# Patient Record
Sex: FEMALE | Race: WHITE | Employment: FULL TIME | URBAN - METROPOLITAN AREA
[De-identification: names, ages, dates, MRNs, and addresses within clinical notes are randomized per-mention and may not be internally consistent; named-entity substitution may affect disease eponyms.]

---

## 2019-10-29 ENCOUNTER — OFFICE VISIT (OUTPATIENT)
Dept: FAMILY MEDICINE | Facility: CLINIC | Age: 54
End: 2019-10-29
Payer: COMMERCIAL

## 2019-10-29 VITALS
DIASTOLIC BLOOD PRESSURE: 90 MMHG | WEIGHT: 206.6 LBS | OXYGEN SATURATION: 96 % | TEMPERATURE: 99 F | HEIGHT: 67 IN | RESPIRATION RATE: 15 BRPM | HEART RATE: 68 BPM | BODY MASS INDEX: 32.43 KG/M2 | SYSTOLIC BLOOD PRESSURE: 150 MMHG

## 2019-10-29 DIAGNOSIS — H65.02 NON-RECURRENT ACUTE SEROUS OTITIS MEDIA OF LEFT EAR: Primary | ICD-10-CM

## 2019-10-29 PROCEDURE — 99203 OFFICE O/P NEW LOW 30 MIN: CPT | Performed by: NURSE PRACTITIONER

## 2019-10-29 RX ORDER — FLUTICASONE PROPIONATE 50 MCG
2 SPRAY, SUSPENSION (ML) NASAL DAILY
Qty: 16 G | Refills: 1 | Status: SHIPPED | OUTPATIENT
Start: 2019-10-29 | End: 2024-04-01

## 2019-10-29 RX ORDER — IBUPROFEN 600 MG/1
600 TABLET, FILM COATED ORAL PRN
Refills: 0 | COMMUNITY
Start: 2019-10-16 | End: 2024-04-23

## 2019-10-29 ASSESSMENT — MIFFLIN-ST. JEOR: SCORE: 1574.76

## 2019-10-29 NOTE — PROGRESS NOTES
Yanira Jaime is a 54 year old female who presents to clinic today for the following health issues:  Ear Problem       Duration: 3 weeks     Description (location/character/radiation): left ear    Intensity:  mild, 3/10    Accompanying signs and symptoms: feels plugged and painful at times; always plugged and when unable to hear will pinch nose and push air to open up the ear and then it is painful    History (similar episodes/previous evaluation): None    Precipitating or alleviating factors: None    Therapies tried and outcome: tylenol    Was taking amoxicillin for 2 weeks that was prescribed by the dentist for an infection in a tooth started on 10/16/19, and felt that there was improvement in the ear also.  BP elevated today. Did take a 5 hour caffeine drink.    No congestion or sinus pressure.  Started with the tooth pain.    Patient Active Problem List   Diagnosis     Tobacco use disorder     History reviewed. No pertinent surgical history.    Social History     Tobacco Use     Smoking status: Current Every Day Smoker     Packs/day: 0.50     Years: 37.00     Pack years: 18.50     Smokeless tobacco: Never Used   Substance Use Topics     Alcohol use: Yes     Comment: 3-4 drinks twice a week; beer     Family History   Problem Relation Age of Onset     CABG Father      Hypertension Father      Hyperlipidemia Father          Current Outpatient Medications   Medication Sig Dispense Refill     ibuprofen (ADVIL/MOTRIN) 600 MG tablet Take 600 mg by mouth as needed  0     No Known Allergies    Reviewed and updated as needed this visit by Provider  Allergies  Meds  Problems  Med Hx  Surg Hx  Fam Hx         Review of Systems   ROS COMP: CONSTITUTIONAL: NEGATIVE for fever, chills, change in weight  ENT/MOUTH: POSITIVE for ear pain left  RESP: NEGATIVE for significant cough or SOB  CV: NEGATIVE for chest pain, palpitations or peripheral edema  PSYCHIATRIC: NEGATIVE for changes in mood or affect  ROS otherwise  "negative      Objective    BP (!) 150/90   Pulse 68   Temp 99  F (37.2  C) (Tympanic)   Resp 15   Ht 1.702 m (5' 7\")   Wt 93.7 kg (206 lb 9.6 oz)   SpO2 96%   BMI 32.36 kg/m    Body mass index is 32.36 kg/m .  Physical Exam   GENERAL: healthy, alert and no distress  HENT: normal cephalic/atraumatic, right ear: normal: no effusions, no erythema, normal landmarks, left ear: serous effusion with mid retraction of the TM, nose and mouth without ulcers or lesions, oropharynx clear, oral mucous membranes moist and sinuses: not tender  RESP: lungs clear to auscultation - no rales, rhonchi or wheezes  CV: regular rate and rhythm, normal S1 S2, no S3 or S4, no murmur, click or rub, no peripheral edema and peripheral pulses strong  PSYCH: mentation appears normal, affect normal/bright    Diagnostic Test Results:  Labs reviewed in Epic        Assessment & Plan     1. Non-recurrent acute serous otitis media of left ear  Treating with Flonase to see if this improves symptoms.  If persistent, recommend follow-up with ENT for further evaluation.  - fluticasone (FLONASE) 50 MCG/ACT nasal spray; Spray 2 sprays into both nostrils daily  Dispense: 16 g; Refill: 1  - OTOLARYNGOLOGY REFERRAL     See Patient Instructions    Return in about 1 week (around 11/5/2019), or if symptoms worsen or fail to improve, for follow-up with ENT.    Char Smallwood NP  [facility]      "

## 2019-10-29 NOTE — PATIENT INSTRUCTIONS
1.  Make appointment with ENT and you can cancel if symptoms improve.  2.  Take nasal spray as directed over the next week.  3.  Follow-up with ENT if symptoms are not improving.

## 2019-11-13 NOTE — PROGRESS NOTES
Chief Complaint   Patient presents with     Ear Problem     history of left ear infection and put on oral antibiotics 10/16/19- c/o pain at intervals and crackling noise in ear- feels plugged     History of Present Illness   Yanira Jaime is a 54 year old female who presents to me today for ear evaluation.  I am seeing this patient in consultation for non-recurrent acute serous otitis media of the left ear at the request of the provider Char Smallwood CNP.  The patient reports pressure or a plugged feeling in left ear.  It has been present and noticeable for approximately 1 month.  This was preceded by an upper respiratory infection and a left ear infection treated with antibiotics. There is no history of chronic ear disease or ear surgery. The patient denies vertigo, otorrhea, but has some mild otalgia.  The patient previous recreational and current work-related noise exposure.  The patient has tried Flonase, but these things have not helped.     Past Medical History  Patient Active Problem List   Diagnosis     Tobacco use disorder     Current Medications     Current Outpatient Medications:      fluticasone (FLONASE) 50 MCG/ACT nasal spray, Spray 2 sprays into both nostrils daily (Patient not taking: Reported on 11/15/2019), Disp: 16 g, Rfl: 1     ibuprofen (ADVIL/MOTRIN) 600 MG tablet, Take 600 mg by mouth as needed, Disp: , Rfl: 0    Allergies  No Known Allergies    Social History   Social History     Socioeconomic History     Marital status: Legally      Spouse name: Not on file     Number of children: Not on file     Years of education: Not on file     Highest education level: Not on file   Occupational History     Not on file   Social Needs     Financial resource strain: Not on file     Food insecurity:     Worry: Not on file     Inability: Not on file     Transportation needs:     Medical: Not on file     Non-medical: Not on file   Tobacco Use     Smoking status: Current Every Day Smoker      Packs/day: 0.50     Years: 37.00     Pack years: 18.50     Smokeless tobacco: Never Used   Substance and Sexual Activity     Alcohol use: Yes     Comment: 3-4 drinks twice a week; beer     Drug use: Not Currently     Sexual activity: Not Currently     Partners: Male   Lifestyle     Physical activity:     Days per week: Not on file     Minutes per session: Not on file     Stress: Not on file   Relationships     Social connections:     Talks on phone: Not on file     Gets together: Not on file     Attends Islam service: Not on file     Active member of club or organization: Not on file     Attends meetings of clubs or organizations: Not on file     Relationship status: Not on file     Intimate partner violence:     Fear of current or ex partner: Not on file     Emotionally abused: Not on file     Physically abused: Not on file     Forced sexual activity: Not on file   Other Topics Concern     Not on file   Social History Narrative     Not on file       Family History  Family History   Problem Relation Age of Onset     CABG Father      Hypertension Father      Hyperlipidemia Father        Review of Systems  As per HPI and PMHx, otherwise 10+ comprehensive system review is negative.    Physical Exam  BP (!) 145/85   Pulse 73   Temp 98.2  F (36.8  C) (Oral)   Wt 93.4 kg (206 lb)   BMI 32.26 kg/m    GENERAL: Patient is a pleasant, cooperative 54 year old female in no acute distress.  HEAD: Normocephalic, atraumatic.  Hair and scalp are normal.  EYES: Pupils are equal, round, reactive to light and accommodation.  Extraocular movements are intact.  The sclera nonicteric without injection.  The extraocular structures are normal.  EARS: See below.  NOSE: Nares are patent.  Nasal mucosa is pink and moist.  Negative anterior rhinoscopy.  NEUROLOGIC: Cranial nerves II through XII are grossly intact.  Voice is strong.  Patient is House-Brackman I/VI bilaterally.  CARDIOVASCULAR: Extremities are warm and well-perfused.  No  significant peripheral edema.  RESPIRATORY: Patient has nonlabored breathing without cough, wheeze, stridor.  PSYCHIATRIC: Patient is alert and oriented.  Mood and affect appear normal.  SKIN: Warm and dry.  No scalp, face, or neck lesions noted.    Physical Exam and Procedure    EARS: Normal shape and symmetry.  No tenderness when palpating the mastoid or tragal areas bilaterally.  The ears were then examined under the otic binocular microscope.  Otoscopic exam on the right reveals a moderate amount of cerumen that I removed with alligator forceps.  The right tympanic membrane was round, intact without evidence of effusion.  No retraction, granulation, drainage, or evidence of cholesteatoma.      Attention was turned to the left ear.  Otoscopic exam on the left reveals a moderate amount of cerumen that I removed with an alligator forceps. The left tympanic membrane was intact with moderate retraction and evidence of serous middle ear effusion.  No granulation, drainage, or evidence of cholesteatoma.      Assessment and Plan     ICD-10-CM    1. Left chronic serous otitis media H65.22 AUDIOLOGY ADULT REFERRAL     Microscopy, Binocular     Tympanotomy W/Tube   2. Conductive hearing loss of left ear with unrestricted hearing of right ear H90.12 AUDIOLOGY ADULT REFERRAL     Microscopy, Binocular     Tympanotomy W/Tube   3. Dysfunction of left eustachian tube H69.82 AUDIOLOGY ADULT REFERRAL     Microscopy, Binocular     Tympanotomy W/Tube   4. Retracted tympanic membrane, left H73.892 AUDIOLOGY ADULT REFERRAL     Microscopy, Binocular     Tympanotomy W/Tube     It was my pleasure seeing Yanira Jaime today in clinic.  The patient presented today today with middle ear effusion and retraction on the left-hand side for over a month.  She tried Flonase without benefit.  She is having problems with her ear still being plugged.  We discussed close observation versus ear tube placement.  After discussion, the patient would  like to move forward with an ear tube on the left.     We discussed the risks, benefits, alternatives, options of left myringotomy with tube placement including, but not limited to: Risk of bleeding, risk of infection, risk of retained tympanostomy tube, risk of tympanic membrane perforation, risk of recurrent otorrhea, tympanostomy tube failure, potential need for additional procedures including tube removal/replacement.  We discussed the postoperative course and convalescence including using eardrops.  The patient voiced understanding and is  willing to proceed.  We see procedure note for the details.  The patient will return in 2 to 3 months for recheck with a hearing test.  We discussed dry ear precautions and what to do in the event of acute otorrhea.    Yanira to follow up with Primary Care provider regarding elevated blood pressure.    Pradip Friedman MD  Department of Otolarygology-Head and Neck Surgery  [facility]

## 2019-11-15 ENCOUNTER — OFFICE VISIT (OUTPATIENT)
Dept: OTOLARYNGOLOGY | Facility: CLINIC | Age: 54
End: 2019-11-15
Payer: COMMERCIAL

## 2019-11-15 VITALS
WEIGHT: 206 LBS | DIASTOLIC BLOOD PRESSURE: 85 MMHG | BODY MASS INDEX: 32.26 KG/M2 | SYSTOLIC BLOOD PRESSURE: 145 MMHG | TEMPERATURE: 98.2 F | HEART RATE: 73 BPM

## 2019-11-15 DIAGNOSIS — H69.92 DYSFUNCTION OF LEFT EUSTACHIAN TUBE: ICD-10-CM

## 2019-11-15 DIAGNOSIS — H90.12 CONDUCTIVE HEARING LOSS OF LEFT EAR WITH UNRESTRICTED HEARING OF RIGHT EAR: ICD-10-CM

## 2019-11-15 DIAGNOSIS — H65.22 LEFT CHRONIC SEROUS OTITIS MEDIA: Primary | ICD-10-CM

## 2019-11-15 DIAGNOSIS — H73.892 RETRACTED TYMPANIC MEMBRANE, LEFT: ICD-10-CM

## 2019-11-15 PROCEDURE — 99203 OFFICE O/P NEW LOW 30 MIN: CPT | Mod: 25 | Performed by: OTOLARYNGOLOGY

## 2019-11-15 PROCEDURE — 69433 CREATE EARDRUM OPENING: CPT | Mod: LT | Performed by: OTOLARYNGOLOGY

## 2019-11-15 NOTE — PROCEDURES
PREOPERATIVE DIAGNOSES: Chronic left otitis media with effusion, history of acute otitis media, left conductive hearing loss.    POSTOPERATIVE DIAGNOSES: Same.     PROCEDURE PERFORMED:   1. Left myringotomy with tympanostomy tube placement     SURGEON: Pradip Friedman MD      ASSISTANTS: None.     BLOOD LOSS: Scant.     COMPLICATIONS: None.      SPECIMENS: None.     ANESTHESIA: Local.     GRAFTS, IMPLANTS, DRAINS: None.     INDICATIONS: Prevent complications, treat disease.    FINDINGS:   1. Left intact tympanic membrane with moderate retraction and serous middle-ear effusion.    OPERATIVE TECHNIQUE: The patient was brought to the procedure room and identified by name clinic number.  They were placed supinely on the procedure chair.  The patient was prepped and draped in standard fashion.       After standard surgical pause, the microscope was brought to the field and used throughout the remainder of the case.  I first examined the ear on the left.  The patient had moderate retraction and evidence of serous middle ear effusion.  Phenol was placed in the posterior-inferior quadrant.  A radial myringotomy was then placed in the posterior-inferior quadrant.  The middle ear was suctioned free and Dura-Vent ear tube was placed into the myringotomy.     This marked the end of the procedure.  The patient tolerated the procedure well.  There were no complications.  There was minimal blood loss.  All standard protocol and universal precautions were used throughout the procedure.     Pradip Friedman MD  Department of Otolarygology-Head and Neck Surgery  [facility]

## 2019-11-15 NOTE — NURSING NOTE
"Initial BP (!) 144/82 (BP Location: Right arm, Patient Position: Chair, Cuff Size: Adult Large)   Pulse 73   Temp 98.2  F (36.8  C) (Oral)   Wt 93.4 kg (206 lb)   BMI 32.26 kg/m   Estimated body mass index is 32.26 kg/m  as calculated from the following:    Height as of 10/29/19: 1.702 m (5' 7\").    Weight as of this encounter: 93.4 kg (206 lb). .    Radhika Perrin LPN    "

## 2019-11-15 NOTE — LETTER
11/15/2019         RE: Yanira Jaime  [address]  [address]  [place], [place] [address]        Dear Colleague,    Thank you for referring your patient, Yanira Jaime, to the [facility]. Please see a copy of my visit note below.    Chief Complaint   Patient presents with     Ear Problem     history of left ear infection and put on oral antibiotics 10/16/19- c/o pain at intervals and crackling noise in ear- feels plugged     History of Present Illness   Yanira Jaime is a 54 year old female who presents to me today for ear evaluation.  I am seeing this patient in consultation for non-recurrent acute serous otitis media of the left ear at the request of the provider Char Smallwood CNP.  The patient reports pressure or a plugged feeling in left ear.  It has been present and noticeable for approximately 1 month.  This was preceded by an upper respiratory infection and a left ear infection treated with antibiotics. There is no history of chronic ear disease or ear surgery. The patient denies vertigo, otorrhea, but has some mild otalgia.  The patient previous recreational and current work-related noise exposure.  The patient has tried Flonase, but these things have not helped.     Past Medical History  Patient Active Problem List   Diagnosis     Tobacco use disorder     Current Medications     Current Outpatient Medications:      fluticasone (FLONASE) 50 MCG/ACT nasal spray, Spray 2 sprays into both nostrils daily (Patient not taking: Reported on 11/15/2019), Disp: 16 g, Rfl: 1     ibuprofen (ADVIL/MOTRIN) 600 MG tablet, Take 600 mg by mouth as needed, Disp: , Rfl: 0    Allergies  No Known Allergies    Social History   Social History     Socioeconomic History     Marital status: Legally      Spouse name: Not on file     Number of children: Not on file     Years of education: Not on file     Highest education level: Not on file   Occupational History     Not on file   Social Needs      Financial resource strain: Not on file     Food insecurity:     Worry: Not on file     Inability: Not on file     Transportation needs:     Medical: Not on file     Non-medical: Not on file   Tobacco Use     Smoking status: Current Every Day Smoker     Packs/day: 0.50     Years: 37.00     Pack years: 18.50     Smokeless tobacco: Never Used   Substance and Sexual Activity     Alcohol use: Yes     Comment: 3-4 drinks twice a week; beer     Drug use: Not Currently     Sexual activity: Not Currently     Partners: Male   Lifestyle     Physical activity:     Days per week: Not on file     Minutes per session: Not on file     Stress: Not on file   Relationships     Social connections:     Talks on phone: Not on file     Gets together: Not on file     Attends Buddhism service: Not on file     Active member of club or organization: Not on file     Attends meetings of clubs or organizations: Not on file     Relationship status: Not on file     Intimate partner violence:     Fear of current or ex partner: Not on file     Emotionally abused: Not on file     Physically abused: Not on file     Forced sexual activity: Not on file   Other Topics Concern     Not on file   Social History Narrative     Not on file       Family History  Family History   Problem Relation Age of Onset     CABG Father      Hypertension Father      Hyperlipidemia Father        Review of Systems  As per HPI and PMHx, otherwise 10+ comprehensive system review is negative.    Physical Exam  BP (!) 145/85   Pulse 73   Temp 98.2  F (36.8  C) (Oral)   Wt 93.4 kg (206 lb)   BMI 32.26 kg/m     GENERAL: Patient is a pleasant, cooperative 54 year old female in no acute distress.  HEAD: Normocephalic, atraumatic.  Hair and scalp are normal.  EYES: Pupils are equal, round, reactive to light and accommodation.  Extraocular movements are intact.  The sclera nonicteric without injection.  The extraocular structures are normal.  EARS: See below.  NOSE: Nares are  patent.  Nasal mucosa is pink and moist.  Negative anterior rhinoscopy.  NEUROLOGIC: Cranial nerves II through XII are grossly intact.  Voice is strong.  Patient is House-Brackman I/VI bilaterally.  CARDIOVASCULAR: Extremities are warm and well-perfused.  No significant peripheral edema.  RESPIRATORY: Patient has nonlabored breathing without cough, wheeze, stridor.  PSYCHIATRIC: Patient is alert and oriented.  Mood and affect appear normal.  SKIN: Warm and dry.  No scalp, face, or neck lesions noted.    Physical Exam and Procedure    EARS: Normal shape and symmetry.  No tenderness when palpating the mastoid or tragal areas bilaterally.  The ears were then examined under the otic binocular microscope.  Otoscopic exam on the right reveals a moderate amount of cerumen that I removed with alligator forceps.  The right tympanic membrane was round, intact without evidence of effusion.  No retraction, granulation, drainage, or evidence of cholesteatoma.      Attention was turned to the left ear.  Otoscopic exam on the left reveals a moderate amount of cerumen that I removed with an alligator forceps. The left tympanic membrane was intact with moderate retraction and evidence of serous middle ear effusion.  No granulation, drainage, or evidence of cholesteatoma.      Assessment and Plan     ICD-10-CM    1. Left chronic serous otitis media H65.22 AUDIOLOGY ADULT REFERRAL     Microscopy, Binocular     Tympanotomy W/Tube   2. Conductive hearing loss of left ear with unrestricted hearing of right ear H90.12 AUDIOLOGY ADULT REFERRAL     Microscopy, Binocular     Tympanotomy W/Tube   3. Dysfunction of left eustachian tube H69.82 AUDIOLOGY ADULT REFERRAL     Microscopy, Binocular     Tympanotomy W/Tube   4. Retracted tympanic membrane, left H73.892 AUDIOLOGY ADULT REFERRAL     Microscopy, Binocular     Tympanotomy W/Tube     It was my pleasure seeing Yanira Jaime today in clinic.  The patient presented today today with middle  ear effusion and retraction on the left-hand side for over a month.  She tried Flonase without benefit.  She is having problems with her ear still being plugged.  We discussed close observation versus ear tube placement.  After discussion, the patient would like to move forward with an ear tube on the left.     We discussed the risks, benefits, alternatives, options of left myringotomy with tube placement including, but not limited to: Risk of bleeding, risk of infection, risk of retained tympanostomy tube, risk of tympanic membrane perforation, risk of recurrent otorrhea, tympanostomy tube failure, potential need for additional procedures including tube removal/replacement.  We discussed the postoperative course and convalescence including using eardrops.  The patient voiced understanding and is  willing to proceed.  We see procedure note for the details.  The patient will return in 2 to 3 months for recheck with a hearing test.  We discussed dry ear precautions and what to do in the event of acute otorrhea.    Pradip Friedman MD  Department of Otolarygology-Head and Neck Surgery  [facility]      Again, thank you for allowing me to participate in the care of your patient.        Sincerely,        Pradip Friedman MD

## 2022-05-05 ENCOUNTER — TRANSFERRED RECORDS (OUTPATIENT)
Dept: MULTI SPECIALTY CLINIC | Facility: CLINIC | Age: 57
End: 2022-05-05

## 2022-05-05 LAB — PAP SMEAR - HIM PATIENT REPORTED: NORMAL

## 2022-08-19 ENCOUNTER — OFFICE VISIT (OUTPATIENT)
Dept: URGENT CARE | Facility: URGENT CARE | Age: 57
End: 2022-08-19
Payer: COMMERCIAL

## 2022-08-19 VITALS
TEMPERATURE: 98 F | SYSTOLIC BLOOD PRESSURE: 162 MMHG | HEART RATE: 82 BPM | DIASTOLIC BLOOD PRESSURE: 80 MMHG | BODY MASS INDEX: 33.36 KG/M2 | WEIGHT: 213 LBS

## 2022-08-19 DIAGNOSIS — I10 BENIGN ESSENTIAL HYPERTENSION: ICD-10-CM

## 2022-08-19 DIAGNOSIS — H69.91 DYSFUNCTION OF RIGHT EUSTACHIAN TUBE: Primary | ICD-10-CM

## 2022-08-19 PROCEDURE — 99213 OFFICE O/P EST LOW 20 MIN: CPT | Performed by: PHYSICIAN ASSISTANT

## 2022-08-19 RX ORDER — ALBUTEROL SULFATE 90 UG/1
AEROSOL, METERED RESPIRATORY (INHALATION)
COMMUNITY
Start: 2022-08-08 | End: 2024-04-23

## 2022-08-19 RX ORDER — FLUTICASONE PROPIONATE 50 MCG
1 SPRAY, SUSPENSION (ML) NASAL 2 TIMES DAILY
Qty: 16 G | Refills: 1 | Status: SHIPPED | OUTPATIENT
Start: 2022-08-19 | End: 2024-04-01

## 2022-08-19 RX ORDER — LISINOPRIL 10 MG/1
TABLET ORAL
COMMUNITY
Start: 2021-10-13 | End: 2024-04-01

## 2022-08-19 RX ORDER — ALBUTEROL SULFATE 90 UG/1
2 AEROSOL, METERED RESPIRATORY (INHALATION)
COMMUNITY
Start: 2022-02-17 | End: 2024-04-01

## 2022-08-19 NOTE — PROGRESS NOTES
Assessment & Plan     Dysfunction of right eustachian tube    - fluticasone (FLONASE) 50 MCG/ACT nasal spray; Spray 1 spray into both nostrils 2 times daily    Benign essential hypertension  Monitor blood pressure at home and if average pressure is greater than or equal to 140/90 follow up with primary care provider                         Return in about 1 week (around 8/26/2022), or if symptoms worsen or fail to improve.    EBER Staley [facility]                Subjective   Chief Complaint   Patient presents with     Ear Problem     Ear pain on the right has been coming on slowly, sharp pains now, popping and cracking.  Had ear infection mid-July, treated with amoxicillin, feels like it wasn't all gone.         HPI     Ear problem   Onset of symptoms was 4 day(s) ago.  Course of illness is worsening.    Severity moderate  Current and Associated symptoms: right ear pain  Treatment measures tried include was treated with amoxicillin for ear infection several weeks ago.  Predisposing factors include None.                Review of Systems   Constitutional, HEENT, cardiovascular, pulmonary, gi and gu systems are negative, except as otherwise noted.      Objective    BP (!) 162/80   Pulse 82   Temp 98  F (36.7  C) (Tympanic)   Wt 96.6 kg (213 lb)   BMI 33.36 kg/m    Body mass index is 33.36 kg/m .  Physical Exam  Constitutional:       Appearance: She is well-developed.   HENT:      Head: Normocephalic.      Right Ear: Tympanic membrane and ear canal normal.      Left Ear: Tympanic membrane and ear canal normal.   Eyes:      Conjunctiva/sclera: Conjunctivae normal.   Cardiovascular:      Rate and Rhythm: Normal rate.      Heart sounds: Normal heart sounds.   Pulmonary:      Effort: Pulmonary effort is normal.      Breath sounds: Normal breath sounds.   Skin:     General: Skin is warm and dry.      Findings: No rash.   Psychiatric:         Behavior: Behavior normal.                             .  ..

## 2024-04-01 ENCOUNTER — OFFICE VISIT (OUTPATIENT)
Dept: URGENT CARE | Facility: URGENT CARE | Age: 59
End: 2024-04-01
Payer: COMMERCIAL

## 2024-04-01 VITALS
SYSTOLIC BLOOD PRESSURE: 148 MMHG | HEART RATE: 60 BPM | WEIGHT: 250 LBS | BODY MASS INDEX: 39.16 KG/M2 | DIASTOLIC BLOOD PRESSURE: 86 MMHG | RESPIRATION RATE: 16 BRPM | TEMPERATURE: 97.3 F | OXYGEN SATURATION: 97 %

## 2024-04-01 DIAGNOSIS — M10.9 ACUTE GOUT INVOLVING TOE OF LEFT FOOT, UNSPECIFIED CAUSE: Primary | ICD-10-CM

## 2024-04-01 PROCEDURE — 99213 OFFICE O/P EST LOW 20 MIN: CPT | Performed by: NURSE PRACTITIONER

## 2024-04-01 RX ORDER — FLUTICASONE PROPIONATE AND SALMETEROL XINAFOATE 45; 21 UG/1; UG/1
2 AEROSOL, METERED RESPIRATORY (INHALATION) EVERY 12 HOURS
COMMUNITY
Start: 2023-02-28 | End: 2024-04-23

## 2024-04-01 RX ORDER — PREDNISONE 20 MG/1
40 TABLET ORAL DAILY
Qty: 10 TABLET | Refills: 0 | Status: SHIPPED | OUTPATIENT
Start: 2024-04-01 | End: 2024-04-06

## 2024-04-01 NOTE — PATIENT INSTRUCTIONS
See handout on gout.  Take prednisone daily for 5 days to help with pain and inflammation.   Follow up with your primary provider for recheck in the next 3-5 days if not improving as expected.

## 2024-04-01 NOTE — PROGRESS NOTES
SUBJECTIVE:   Yanira Jaime is a 58 year old female presenting with a chief complaint of   Chief Complaint   Patient presents with    Arthritis     About 7 days ago pt noticed her left great toe hurt, her foot gradually got more swollen and painful, has difficulty at her job where she does a lot of walking.  Pain and swelling is just in the left foot.  Needs a work note.       No past medical history on file.  Family History   Problem Relation Age of Onset    CABG Father     Hypertension Father     Hyperlipidemia Father      Current Outpatient Medications   Medication Sig Dispense Refill    fluticasone-salmeterol (ADVAIR-HFA) 45-21 MCG/ACT inhaler Inhale 2 puffs into the lungs every 12 hours      albuterol (PROAIR HFA/PROVENTIL HFA/VENTOLIN HFA) 108 (90 Base) MCG/ACT inhaler  (Patient not taking: Reported on 4/1/2024)      albuterol (PROAIR HFA/PROVENTIL HFA/VENTOLIN HFA) 108 (90 Base) MCG/ACT inhaler Inhale 2 puffs into the lungs (Patient not taking: Reported on 4/1/2024)      fluticasone (FLONASE) 50 MCG/ACT nasal spray Spray 1 spray into both nostrils 2 times daily (Patient not taking: Reported on 4/1/2024) 16 g 1    fluticasone (FLONASE) 50 MCG/ACT nasal spray Spray 2 sprays into both nostrils daily (Patient not taking: Reported on 11/15/2019) 16 g 1    ibuprofen (ADVIL/MOTRIN) 600 MG tablet Take 600 mg by mouth as needed (Patient not taking: Reported on 4/1/2024)  0    lisinopril (ZESTRIL) 10 MG tablet  (Patient not taking: Reported on 4/1/2024)       Social History     Tobacco Use    Smoking status: Every Day     Packs/day: 0.50     Years: 37.00     Additional pack years: 0.00     Total pack years: 18.50     Types: Cigarettes    Smokeless tobacco: Never   Substance Use Topics    Alcohol use: Yes     Comment: 3-4 drinks twice a week; beer       OBJECTIVE  BP (!) 148/86   Pulse 60   Temp 97.3  F (36.3  C) (Tympanic)   Resp 16   Wt 113.4 kg (250 lb)   SpO2 97%   BMI 39.16 kg/m      Physical  Exam  Musculoskeletal:        Feet:    Feet:      Comments: Redness, swelling and pain with palpation.    Pt reports history of gout but usually in the other foot. Does not have a uric acid on file, will check uric acid today and start prednisone.    Uric Acid: pending    ASSESSMENT:  1. Acute gout involving toe of left foot, unspecified cause    - predniSONE (DELTASONE) 20 MG tablet; Take 2 tablets (40 mg) by mouth daily for 5 days  Dispense: 10 tablet; Refill: 0  - Uric acid; Future  - Uric acid    PLAN:  See handout on gout.  Take prednisone daily for 5 days to help with pain and inflammation.   Follow up with your primary provider for recheck in the next 3-5 days if not improving as expected.

## 2024-04-01 NOTE — LETTER
April 1, 2024      Yanira Jaime  [address] [facility] [address]        To Whom It May Concern:    Yaniar Jaime  was seen on 4/1/24.  She will need to miss work for the next few days. I expect her condition to improve over the next 2-3 days. Please excuse her absence.      Sincerely,        Areli Ellison NP

## 2024-04-02 ENCOUNTER — DOCUMENTATION ONLY (OUTPATIENT)
Dept: URGENT CARE | Facility: URGENT CARE | Age: 59
End: 2024-04-02
Payer: COMMERCIAL

## 2024-04-02 DIAGNOSIS — M10.9 ACUTE GOUT INVOLVING TOE OF LEFT FOOT, UNSPECIFIED CAUSE: Primary | ICD-10-CM

## 2024-04-02 NOTE — PROGRESS NOTES
"Patient was in on 4/1/2024 for urgent care.  An uric acid was ordered and then collected by lab staff.  We were notified this morning that the test had to be canceled as \"Quantity Not Sufficient\".  If you would like the patient come back for this test please sign pended order and have patient contacted to make a lab-only appointment.  Thanks!  "

## 2024-04-03 NOTE — PROGRESS NOTES
Patient informed about blood draw and told new order was in and to do a lab only visit.    Claudia Macdonald LPN

## 2024-04-23 ENCOUNTER — OFFICE VISIT (OUTPATIENT)
Dept: FAMILY MEDICINE | Facility: CLINIC | Age: 59
End: 2024-04-23
Payer: COMMERCIAL

## 2024-04-23 VITALS
OXYGEN SATURATION: 96 % | HEART RATE: 72 BPM | WEIGHT: 229 LBS | RESPIRATION RATE: 18 BRPM | SYSTOLIC BLOOD PRESSURE: 162 MMHG | TEMPERATURE: 97.5 F | HEIGHT: 67 IN | BODY MASS INDEX: 35.94 KG/M2 | DIASTOLIC BLOOD PRESSURE: 90 MMHG

## 2024-04-23 DIAGNOSIS — J45.40 MODERATE PERSISTENT ASTHMA WITHOUT COMPLICATION: ICD-10-CM

## 2024-04-23 DIAGNOSIS — G43.109 MIGRAINE WITH AURA AND WITHOUT STATUS MIGRAINOSUS, NOT INTRACTABLE: Primary | ICD-10-CM

## 2024-04-23 DIAGNOSIS — Z82.49 FAMILY HISTORY OF ISCHEMIC HEART DISEASE: ICD-10-CM

## 2024-04-23 DIAGNOSIS — Z12.31 VISIT FOR SCREENING MAMMOGRAM: ICD-10-CM

## 2024-04-23 DIAGNOSIS — R03.0 ELEVATED BLOOD PRESSURE READING WITHOUT DIAGNOSIS OF HYPERTENSION: ICD-10-CM

## 2024-04-23 DIAGNOSIS — M25.552 BILATERAL HIP PAIN: ICD-10-CM

## 2024-04-23 DIAGNOSIS — G62.9 NEUROPATHY: ICD-10-CM

## 2024-04-23 DIAGNOSIS — R90.82 WHITE MATTER ABNORMALITY ON MRI OF BRAIN: ICD-10-CM

## 2024-04-23 DIAGNOSIS — M10.9 ACUTE GOUT INVOLVING TOE OF LEFT FOOT, UNSPECIFIED CAUSE: ICD-10-CM

## 2024-04-23 DIAGNOSIS — M25.551 BILATERAL HIP PAIN: ICD-10-CM

## 2024-04-23 DIAGNOSIS — Z82.0 FAMILY HISTORY OF MS (MULTIPLE SCLEROSIS): ICD-10-CM

## 2024-04-23 LAB
ALBUMIN SERPL BCG-MCNC: 4.1 G/DL (ref 3.5–5.2)
ALBUMIN UR-MCNC: NEGATIVE MG/DL
ALP SERPL-CCNC: 79 U/L (ref 40–150)
ALT SERPL W P-5'-P-CCNC: 25 U/L (ref 0–50)
ANION GAP SERPL CALCULATED.3IONS-SCNC: 14 MMOL/L (ref 7–15)
APPEARANCE UR: CLEAR
AST SERPL W P-5'-P-CCNC: 23 U/L (ref 0–45)
BACTERIA #/AREA URNS HPF: ABNORMAL /HPF
BILIRUB SERPL-MCNC: <0.2 MG/DL
BILIRUB UR QL STRIP: NEGATIVE
BUN SERPL-MCNC: 13.1 MG/DL (ref 6–20)
CALCIUM SERPL-MCNC: 9.1 MG/DL (ref 8.6–10)
CHLORIDE SERPL-SCNC: 103 MMOL/L (ref 98–107)
CHOLEST SERPL-MCNC: 197 MG/DL
COLOR UR AUTO: YELLOW
CREAT SERPL-MCNC: 0.77 MG/DL (ref 0.51–0.95)
CRP SERPL-MCNC: 18.42 MG/L
DEPRECATED HCO3 PLAS-SCNC: 22 MMOL/L (ref 22–29)
EGFRCR SERPLBLD CKD-EPI 2021: 89 ML/MIN/1.73M2
ERYTHROCYTE [DISTWIDTH] IN BLOOD BY AUTOMATED COUNT: 12.6 % (ref 10–15)
ERYTHROCYTE [SEDIMENTATION RATE] IN BLOOD BY WESTERGREN METHOD: 25 MM/HR (ref 0–30)
FASTING STATUS PATIENT QL REPORTED: NO
FERRITIN SERPL-MCNC: 232 NG/ML (ref 11–328)
GLUCOSE SERPL-MCNC: 131 MG/DL (ref 70–99)
GLUCOSE UR STRIP-MCNC: NEGATIVE MG/DL
HBA1C MFR BLD: 6.3 % (ref 0–5.6)
HCT VFR BLD AUTO: 42.6 % (ref 35–47)
HDLC SERPL-MCNC: 37 MG/DL
HGB BLD-MCNC: 14.2 G/DL (ref 11.7–15.7)
HGB UR QL STRIP: ABNORMAL
IRON BINDING CAPACITY (ROCHE): 261 UG/DL (ref 240–430)
IRON SATN MFR SERPL: 24 % (ref 15–46)
IRON SERPL-MCNC: 62 UG/DL (ref 37–145)
KETONES UR STRIP-MCNC: NEGATIVE MG/DL
LDLC SERPL CALC-MCNC: ABNORMAL MG/DL
LEUKOCYTE ESTERASE UR QL STRIP: NEGATIVE
MCH RBC QN AUTO: 29.9 PG (ref 26.5–33)
MCHC RBC AUTO-ENTMCNC: 33.3 G/DL (ref 31.5–36.5)
MCV RBC AUTO: 90 FL (ref 78–100)
NITRATE UR QL: NEGATIVE
NONHDLC SERPL-MCNC: 160 MG/DL
PH UR STRIP: 5.5 [PH] (ref 5–7)
PLATELET # BLD AUTO: 302 10E3/UL (ref 150–450)
POTASSIUM SERPL-SCNC: 4.3 MMOL/L (ref 3.4–5.3)
PROT SERPL-MCNC: 7.3 G/DL (ref 6.4–8.3)
RBC # BLD AUTO: 4.75 10E6/UL (ref 3.8–5.2)
RBC #/AREA URNS AUTO: ABNORMAL /HPF
SODIUM SERPL-SCNC: 139 MMOL/L (ref 135–145)
SP GR UR STRIP: 1.02 (ref 1–1.03)
SQUAMOUS #/AREA URNS AUTO: ABNORMAL /LPF
TRIGL SERPL-MCNC: 413 MG/DL
TSH SERPL DL<=0.005 MIU/L-ACNC: 1.64 UIU/ML (ref 0.3–4.2)
URATE SERPL-MCNC: 7.6 MG/DL (ref 2.4–5.7)
UROBILINOGEN UR STRIP-ACNC: 0.2 E.U./DL
WBC # BLD AUTO: 8.7 10E3/UL (ref 4–11)
WBC #/AREA URNS AUTO: ABNORMAL /HPF

## 2024-04-23 PROCEDURE — 82607 VITAMIN B-12: CPT | Performed by: STUDENT IN AN ORGANIZED HEALTH CARE EDUCATION/TRAINING PROGRAM

## 2024-04-23 PROCEDURE — 83721 ASSAY OF BLOOD LIPOPROTEIN: CPT | Mod: 59 | Performed by: STUDENT IN AN ORGANIZED HEALTH CARE EDUCATION/TRAINING PROGRAM

## 2024-04-23 PROCEDURE — 85652 RBC SED RATE AUTOMATED: CPT | Performed by: STUDENT IN AN ORGANIZED HEALTH CARE EDUCATION/TRAINING PROGRAM

## 2024-04-23 PROCEDURE — 80053 COMPREHEN METABOLIC PANEL: CPT | Performed by: STUDENT IN AN ORGANIZED HEALTH CARE EDUCATION/TRAINING PROGRAM

## 2024-04-23 PROCEDURE — 84443 ASSAY THYROID STIM HORMONE: CPT | Performed by: STUDENT IN AN ORGANIZED HEALTH CARE EDUCATION/TRAINING PROGRAM

## 2024-04-23 PROCEDURE — 85027 COMPLETE CBC AUTOMATED: CPT | Performed by: STUDENT IN AN ORGANIZED HEALTH CARE EDUCATION/TRAINING PROGRAM

## 2024-04-23 PROCEDURE — 80061 LIPID PANEL: CPT | Performed by: STUDENT IN AN ORGANIZED HEALTH CARE EDUCATION/TRAINING PROGRAM

## 2024-04-23 PROCEDURE — 84550 ASSAY OF BLOOD/URIC ACID: CPT | Performed by: NURSE PRACTITIONER

## 2024-04-23 PROCEDURE — 83550 IRON BINDING TEST: CPT | Performed by: STUDENT IN AN ORGANIZED HEALTH CARE EDUCATION/TRAINING PROGRAM

## 2024-04-23 PROCEDURE — 99204 OFFICE O/P NEW MOD 45 MIN: CPT | Performed by: STUDENT IN AN ORGANIZED HEALTH CARE EDUCATION/TRAINING PROGRAM

## 2024-04-23 PROCEDURE — 83540 ASSAY OF IRON: CPT | Performed by: STUDENT IN AN ORGANIZED HEALTH CARE EDUCATION/TRAINING PROGRAM

## 2024-04-23 PROCEDURE — 83036 HEMOGLOBIN GLYCOSYLATED A1C: CPT | Performed by: STUDENT IN AN ORGANIZED HEALTH CARE EDUCATION/TRAINING PROGRAM

## 2024-04-23 PROCEDURE — 82728 ASSAY OF FERRITIN: CPT | Performed by: STUDENT IN AN ORGANIZED HEALTH CARE EDUCATION/TRAINING PROGRAM

## 2024-04-23 PROCEDURE — 86140 C-REACTIVE PROTEIN: CPT | Performed by: STUDENT IN AN ORGANIZED HEALTH CARE EDUCATION/TRAINING PROGRAM

## 2024-04-23 PROCEDURE — 36415 COLL VENOUS BLD VENIPUNCTURE: CPT | Performed by: STUDENT IN AN ORGANIZED HEALTH CARE EDUCATION/TRAINING PROGRAM

## 2024-04-23 PROCEDURE — 81001 URINALYSIS AUTO W/SCOPE: CPT | Performed by: STUDENT IN AN ORGANIZED HEALTH CARE EDUCATION/TRAINING PROGRAM

## 2024-04-23 RX ORDER — BUDESONIDE AND FORMOTEROL FUMARATE DIHYDRATE 160; 4.5 UG/1; UG/1
AEROSOL RESPIRATORY (INHALATION)
Qty: 20.4 G | Refills: 11 | Status: SHIPPED | OUTPATIENT
Start: 2024-04-23 | End: 2024-04-30

## 2024-04-23 RX ORDER — ALBUTEROL SULFATE 90 UG/1
1-2 AEROSOL, METERED RESPIRATORY (INHALATION) EVERY 6 HOURS PRN
Qty: 18 G | Refills: 3 | Status: SHIPPED | OUTPATIENT
Start: 2024-04-23 | End: 2024-04-30

## 2024-04-23 ASSESSMENT — PAIN SCALES - GENERAL: PAINLEVEL: NO PAIN (0)

## 2024-04-23 NOTE — PROGRESS NOTES
Assessment & Plan     Patient is a very pleasant 58-year-old female presents today to establish care.  She has not been within the healthcare system for years.  She only updated on annual screenings. While here today, we discussed multiple ongoing concerns.    Migraine with aura and without status migrainosus, not intractable  Patient has ongoing recurrent migraines with aura.  These are newer for her, worsening over time.  She does have a family history of MS, see below, and is concerned about that.  We did discuss her blood pressure is quite high today, she is noting that she did not hear pulsation in her ears.  At this point, like this may represent true migraines but I feel they may at least partly be due to blood pressure, uncontrolled.  See below for details regarding blood pressure.  Continue to follow-up regarding this at her follow-up visits.    Elevated blood pressure reading without diagnosis of hypertension  On exam today, patient's blood pressure is quite elevated in the 160s over 90s.  She does not check blood pressure at home.  She is given a prescription for home blood pressure monitor.  In addition for initial workup, below labs are completed today.  Low concern for acute cardiac event without symptomatic chest pain.  Patient will monitor blood pressures at home with home cuff.  Will await home blood pressure numbers prior to initiating medications.  With elevated uric acid as noted below, will plan to avoid thiazide diuretics.  Plan to start with losartan, likely 50 mg will potentially need higher dosage.  - Home Blood Pressure Monitor Order  - CBC with platelets  - Hemoglobin A1c  - Lipid panel reflex to direct LDL Non-fasting  - TSH with free T4 reflex  - UA Macroscopic with reflex to Microscopic and Culture  - Comprehensive metabolic panel  - PRIMARY CARE FOLLOW-UP SCHEDULING  - CBC with platelets  - Hemoglobin A1c  - Lipid panel reflex to direct LDL Non-fasting  - TSH with free T4 reflex  -  UA Macroscopic with reflex to Microscopic and Culture  - Comprehensive metabolic panel  - UA Microscopic with Reflex to Culture  - LDL cholesterol direct    Family history of ischemic heart disease  Patient does have a family history of ischemic heart disease, certainly will discuss consideration of statin therapy with elevated blood pressure and ASCVD risk of around 11%.   - PRIMARY CARE FOLLOW-UP SCHEDULING    Moderate persistent asthma without complication  Patient is longstanding history of asthma.  Has been using albuterol multiple times a day.  We discussed alternative therapy such as with Symbicort given the persistent nature of her asthma.  She was interested in Symbicort usage for Smart therapy.  Will trial this.  Albuterol given as well in case she does need additional coverage.  - albuterol (PROAIR HFA/PROVENTIL HFA/VENTOLIN HFA) 108 (90 Base) MCG/ACT inhaler  Dispense: 18 g; Refill: 3  - budesonide-formoterol (SYMBICORT) 160-4.5 MCG/ACT Inhaler  Dispense: 20.4 g; Refill: 11  - PRIMARY CARE FOLLOW-UP SCHEDULING    Bilateral hip pain  Patient's concerns today of bilateral hip and low back pain.  She is specially concerned about this as it is excruciating at times.  Given location of this, we discussed this unlikely to be related to true hip joint pains.  Given location we discussed likely trochanteric bursitis versus IT band syndrome.  That said, with concern for gout as below, patient concern for MS given family history, we did opt for inflammatory markers today.  CRP is mildly elevated but ESR is normal.  - Erythrocyte sedimentation rate auto  - CRP inflammation  - PRIMARY CARE FOLLOW-UP SCHEDULING  - Erythrocyte sedimentation rate auto  - CRP inflammation    Neuropathy  Patient has been having some signs of neuropathy in the low back and lower extremities.  We did opt for the below workup today.  In addition, with her family history of MS, we did opt to obtain an MRI of the brain.  This also be helpful  "in the context of headaches as noted above.  - Vitamin B12  - Ferritin  - Iron and iron binding capacity  - PRIMARY CARE FOLLOW-UP SCHEDULING  - Vitamin B12  - Ferritin  - Iron and iron binding capacity  - MR Brain w/o & w Contrast    Acute gout involving toe of left foot, unspecified cause  Patient was seen in urgent care for concern of acute gout flare.  Pain has improved, but she was not seen in follow-up for uric acid level.  This was drawn today and is mildly elevated, concerning for risk for gout.  Consider allopurinol to decrease uric acid below 7.    Family history of MS (multiple sclerosis)  As noted previously, patient is a family history of MS, she is concerned with the variable symptoms that she is experiencing that she may also be at risk for this.  First, she is having \"excruciating pain\" in the low back and lateral hips bilaterally at times, chronic fatigue, also a vibrating sensation in the perineum without true muscle fasciculations.  In addition, headaches.  Does not like headaches have changed over time certainly, and pain is variable.  MRI of the brain is ordered as above, pending completion.    Visit for screening mammogram  Due for mammogram for annual screening, referral placed today so patient is able to have this completed.  - MA SCREENING DIGITAL BILAT - Future  (s+30)      I spent a total of 45 minutes on the day of the visit.   Time spent by me doing chart review, history and exam, documentation and further activities per the note      Nicotine/Tobacco Cessation  She reports that she has been smoking. She has a 18.5 pack-year smoking history. She has never used smokeless tobacco.  Nicotine/Tobacco Cessation Plan  Did not discuss today. Will revisit at future visit.       BMI  Estimated body mass index is 35.87 kg/m  as calculated from the following:    Height as of this encounter: 1.702 m (5' 7\").    Weight as of this encounter: 103.9 kg (229 lb).       Subjective   Yanira is a 58 year " "old, presenting for the following health issues:  Establish Care, Headache, OTHER (Questions about MS testing, runs in the family), Musculoskeletal Problem (Bilateral, will be walking and suddenly feels \"bogged down\" ), and Mass (Both sides of neck)        4/23/2024     1:45 PM   Additional Questions   Roomed by Clarice EWIR   Accompanied by Self     Via the Health Maintenance questionnaire, the patient has reported the following services have been completed -Cervical Cancer Screening, this information has been sent to the abstraction team.  History of Present Illness       Reason for visit:  Looking fora new doctor    She eats 2-3 servings of fruits and vegetables daily.She consumes 2 sweetened beverage(s) daily.She exercises with enough effort to increase her heart rate 9 or less minutes per day.  She exercises with enough effort to increase her heart rate 3 or less days per week.   She is taking medications regularly.     Family history of MS, some of her symptoms are similar to these family members.   Sometimes with hips feel like they're dragging her down   Pain is excruciating  Comes and goes.   About 2 months.   Worsening over time.   More lateral? Painful feels like trying to stop her from walking.   No falls because of it.   No groin pain or buttock pain.   Some low back pain occasionally. Not all the time.     Chronic fatigue    Vibrating in the vaginal area - not actual muscle spasms, just feels tingling/vibratory  2-3 months.  No loss of control of bowel or bladder.      Neck masses: based of neck bilaterally. Not pulsatile.     Headaches:   Hears wooshing in the ears.   Starts as a little vision issue, start of in the middle of vision then the whole lateral visin, then bigger, then involves the other side.   If doesn't take something gets a bad headache.   Tylenol tends to help prevent wrose headache, still with headache.   Head throbbing. No sensitivity to light and sound.   Not worsening.   Ongoing for " "\"a long time\" previously once every 6 months. Now at least once a month, sometimes multiple times a month.   Headache lasts then for the rest of the day. If takes soemthing just not as severe.   Aura lasts for 20 minutes almost exactly.   Started about 10 years ago.     Hypertension          No data to display                   No data to display                  Review of Systems  Constitutional, HEENT, cardiovascular, pulmonary, gi and gu systems are negative, except as otherwise noted.      Objective    BP (!) 162/90 (BP Location: Right arm, Patient Position: Sitting, Cuff Size: Adult Regular)   Pulse 72   Temp 97.5  F (36.4  C) (Tympanic)   Resp 18   Ht 1.702 m (5' 7\")   Wt 103.9 kg (229 lb)   SpO2 96%   BMI 35.87 kg/m    Body mass index is 35.87 kg/m .  Physical Exam  Constitutional:       Appearance: Normal appearance.   HENT:      Head: Normocephalic.   Eyes:      General: No scleral icterus.     Extraocular Movements: Extraocular movements intact.      Conjunctiva/sclera: Conjunctivae normal.   Cardiovascular:      Rate and Rhythm: Normal rate and regular rhythm.      Heart sounds: Normal heart sounds.   Pulmonary:      Effort: Pulmonary effort is normal.      Breath sounds: Normal breath sounds.   Musculoskeletal:         General: Normal range of motion.      Cervical back: Normal range of motion.      Right lower leg: Edema (trace to mid shin) present.      Left lower leg: Edema (trace to mid shin) present.   Neurological:      General: No focal deficit present.      Mental Status: She is alert and oriented to person, place, and time.           Results from this visit  Results for orders placed or performed in visit on 04/23/24   CBC with platelets     Status: Normal   Result Value Ref Range    WBC Count 8.7 4.0 - 11.0 10e3/uL    RBC Count 4.75 3.80 - 5.20 10e6/uL    Hemoglobin 14.2 11.7 - 15.7 g/dL    Hematocrit 42.6 35.0 - 47.0 %    MCV 90 78 - 100 fL    MCH 29.9 26.5 - 33.0 pg    MCHC 33.3 31.5 - " 36.5 g/dL    RDW 12.6 10.0 - 15.0 %    Platelet Count 302 150 - 450 10e3/uL   Hemoglobin A1c     Status: Abnormal   Result Value Ref Range    Hemoglobin A1C 6.3 (H) 0.0 - 5.6 %   Lipid panel reflex to direct LDL Non-fasting     Status: Abnormal   Result Value Ref Range    Cholesterol 197 <200 mg/dL    Triglycerides 413 (H) <150 mg/dL    Direct Measure HDL 37 (L) >=50 mg/dL    LDL Cholesterol Calculated      Non HDL Cholesterol 160 (H) <130 mg/dL    Patient Fasting > 8hrs? No     Narrative    Cholesterol  Desirable:  <200 mg/dL    Triglycerides  Normal:  Less than 150 mg/dL  Borderline High:  150-199 mg/dL  High:  200-499 mg/dL  Very High:  Greater than or equal to 500 mg/dL    Direct Measure HDL  Female:  Greater than or equal to 50 mg/dL   Male:  Greater than or equal to 40 mg/dL    LDL Cholesterol  Desirable:  <100mg/dL  Above Desirable:  100-129 mg/dL   Borderline High:  130-159 mg/dL   High:  160-189 mg/dL   Very High:  >= 190 mg/dL    Non HDL Cholesterol  Desirable:  130 mg/dL  Above Desirable:  130-159 mg/dL  Borderline High:  160-189 mg/dL  High:  190-219 mg/dL  Very High:  Greater than or equal to 220 mg/dL   TSH with free T4 reflex     Status: Normal   Result Value Ref Range    TSH 1.64 0.30 - 4.20 uIU/mL   UA Macroscopic with reflex to Microscopic and Culture     Status: Abnormal    Specimen: Urine, Midstream   Result Value Ref Range    Color Urine Yellow Colorless, Straw, Light Yellow, Yellow    Appearance Urine Clear Clear    Glucose Urine Negative Negative mg/dL    Bilirubin Urine Negative Negative    Ketones Urine Negative Negative mg/dL    Specific Gravity Urine 1.025 1.003 - 1.035    Blood Urine Trace (A) Negative    pH Urine 5.5 5.0 - 7.0    Protein Albumin Urine Negative Negative mg/dL    Urobilinogen Urine 0.2 0.2, 1.0 E.U./dL    Nitrite Urine Negative Negative    Leukocyte Esterase Urine Negative Negative   Comprehensive metabolic panel     Status: Abnormal   Result Value Ref Range    Sodium 139  135 - 145 mmol/L    Potassium 4.3 3.4 - 5.3 mmol/L    Carbon Dioxide (CO2) 22 22 - 29 mmol/L    Anion Gap 14 7 - 15 mmol/L    Urea Nitrogen 13.1 6.0 - 20.0 mg/dL    Creatinine 0.77 0.51 - 0.95 mg/dL    GFR Estimate 89 >60 mL/min/1.73m2    Calcium 9.1 8.6 - 10.0 mg/dL    Chloride 103 98 - 107 mmol/L    Glucose 131 (H) 70 - 99 mg/dL    Alkaline Phosphatase 79 40 - 150 U/L    AST 23 0 - 45 U/L    ALT 25 0 - 50 U/L    Protein Total 7.3 6.4 - 8.3 g/dL    Albumin 4.1 3.5 - 5.2 g/dL    Bilirubin Total <0.2 <=1.2 mg/dL   Erythrocyte sedimentation rate auto     Status: Normal   Result Value Ref Range    Erythrocyte Sedimentation Rate 25 0 - 30 mm/hr   CRP inflammation     Status: Abnormal   Result Value Ref Range    CRP Inflammation 18.42 (H) <5.00 mg/L   Vitamin B12     Status: Normal   Result Value Ref Range    Vitamin B12 732 232 - 1,245 pg/mL   Ferritin     Status: Normal   Result Value Ref Range    Ferritin 232 11 - 328 ng/mL   Iron and iron binding capacity     Status: Normal   Result Value Ref Range    Iron 62 37 - 145 ug/dL    Iron Binding Capacity 261 240 - 430 ug/dL    Iron Sat Index 24 15 - 46 %   UA Microscopic with Reflex to Culture     Status: Abnormal   Result Value Ref Range    Bacteria Urine Few (A) None Seen /HPF    RBC Urine 0-2 0-2 /HPF /HPF    WBC Urine 0-5 0-5 /HPF /HPF    Squamous Epithelials Urine Moderate (A) None Seen /LPF    Narrative    Urine Culture not indicated   LDL cholesterol direct     Status: Abnormal   Result Value Ref Range    LDL Cholesterol Direct 120 (H) <100 mg/dL             Signed Electronically by: Alyx Tanner MD  DME (Durable Medical Equipment) Orders and Documentation  Orders Placed This Encounter   Procedures    Home Blood Pressure Monitor Order        The patient was assessed and it was determined the patient is in need of the following listed DME Supplies/Equipment. Please complete supporting documentation below to demonstrate medical necessity.

## 2024-04-23 NOTE — PATIENT INSTRUCTIONS
Blood Pressure Log    You get a blood pressure cuff, send me a message in about 1-2 weeks and let me know what numbers you're getting.

## 2024-04-24 ENCOUNTER — MYC MEDICAL ADVICE (OUTPATIENT)
Dept: FAMILY MEDICINE | Facility: CLINIC | Age: 59
End: 2024-04-24
Payer: COMMERCIAL

## 2024-04-24 DIAGNOSIS — J45.40 MODERATE PERSISTENT ASTHMA WITHOUT COMPLICATION: ICD-10-CM

## 2024-04-24 DIAGNOSIS — E78.5 HYPERLIPIDEMIA LDL GOAL <100: Primary | ICD-10-CM

## 2024-04-24 DIAGNOSIS — I10 BENIGN ESSENTIAL HYPERTENSION: ICD-10-CM

## 2024-04-24 LAB
LDLC SERPL DIRECT ASSAY-MCNC: 120 MG/DL
VIT B12 SERPL-MCNC: 732 PG/ML (ref 232–1245)

## 2024-04-26 RX ORDER — ATORVASTATIN CALCIUM 20 MG/1
TABLET, FILM COATED ORAL
Qty: 150 TABLET | Refills: 0 | Status: SHIPPED | OUTPATIENT
Start: 2024-04-26 | End: 2024-04-29

## 2024-04-26 NOTE — TELEPHONE ENCOUNTER
Pls see [employer] message below.     Recent Labs   Lab Test 04/23/24  1445   CHOL 197   HDL 37*   *   TRIG 413*     RX pended, message routed to provider for consideration of RX.    Julie Behrendt RN

## 2024-04-29 RX ORDER — ATORVASTATIN CALCIUM 20 MG/1
TABLET, FILM COATED ORAL
Qty: 150 TABLET | Refills: 0 | Status: SHIPPED | OUTPATIENT
Start: 2024-04-29 | End: 2024-04-30

## 2024-04-29 RX ORDER — LOSARTAN POTASSIUM 50 MG/1
TABLET ORAL
Qty: 159 TABLET | Refills: 0 | Status: SHIPPED | OUTPATIENT
Start: 2024-04-29 | End: 2024-04-29

## 2024-04-29 RX ORDER — LOSARTAN POTASSIUM 50 MG/1
TABLET ORAL
Qty: 159 TABLET | Refills: 0 | Status: SHIPPED | OUTPATIENT
Start: 2024-04-29 | End: 2024-04-30

## 2024-04-30 RX ORDER — ALBUTEROL SULFATE 90 UG/1
1-2 AEROSOL, METERED RESPIRATORY (INHALATION) EVERY 6 HOURS PRN
Qty: 18 G | Refills: 3 | Status: SHIPPED | OUTPATIENT
Start: 2024-04-30 | End: 2024-05-30

## 2024-04-30 RX ORDER — BUDESONIDE AND FORMOTEROL FUMARATE DIHYDRATE 160; 4.5 UG/1; UG/1
AEROSOL RESPIRATORY (INHALATION)
Qty: 20.4 G | Refills: 11 | Status: SHIPPED | OUTPATIENT
Start: 2024-04-30 | End: 2024-06-29

## 2024-04-30 RX ORDER — ATORVASTATIN CALCIUM 20 MG/1
TABLET, FILM COATED ORAL
Qty: 150 TABLET | Refills: 0 | Status: SHIPPED | OUTPATIENT
Start: 2024-04-30 | End: 2024-07-28

## 2024-04-30 RX ORDER — LOSARTAN POTASSIUM 50 MG/1
TABLET ORAL
Qty: 159 TABLET | Refills: 0 | Status: SHIPPED | OUTPATIENT
Start: 2024-04-30 | End: 2024-05-30

## 2024-05-05 ENCOUNTER — HEALTH MAINTENANCE LETTER (OUTPATIENT)
Age: 59
End: 2024-05-05

## 2024-05-10 ENCOUNTER — HOSPITAL ENCOUNTER (OUTPATIENT)
Dept: MRI IMAGING | Facility: CLINIC | Age: 59
Discharge: HOME | End: 2024-05-10
Attending: STUDENT IN AN ORGANIZED HEALTH CARE EDUCATION/TRAINING PROGRAM | Admitting: STUDENT IN AN ORGANIZED HEALTH CARE EDUCATION/TRAINING PROGRAM
Payer: COMMERCIAL

## 2024-05-10 DIAGNOSIS — G62.9 NEUROPATHY: ICD-10-CM

## 2024-05-10 PROCEDURE — A9585 GADOBUTROL INJECTION: HCPCS | Performed by: STUDENT IN AN ORGANIZED HEALTH CARE EDUCATION/TRAINING PROGRAM

## 2024-05-10 PROCEDURE — 255N000002 HC RX 255 OP 636: Performed by: STUDENT IN AN ORGANIZED HEALTH CARE EDUCATION/TRAINING PROGRAM

## 2024-05-10 PROCEDURE — 70553 MRI BRAIN STEM W/O & W/DYE: CPT

## 2024-05-10 RX ORDER — GADOBUTROL 604.72 MG/ML
10 INJECTION INTRAVENOUS ONCE
Status: COMPLETED | OUTPATIENT
Start: 2024-05-10 | End: 2024-05-10

## 2024-05-10 RX ADMIN — GADOBUTROL 10 ML: 604.72 INJECTION INTRAVENOUS at 13:56

## 2024-05-30 ENCOUNTER — OFFICE VISIT (OUTPATIENT)
Dept: FAMILY MEDICINE | Facility: CLINIC | Age: 59
End: 2024-05-30
Payer: COMMERCIAL

## 2024-05-30 VITALS
BODY MASS INDEX: 35.94 KG/M2 | HEART RATE: 68 BPM | TEMPERATURE: 97.9 F | SYSTOLIC BLOOD PRESSURE: 170 MMHG | DIASTOLIC BLOOD PRESSURE: 100 MMHG | OXYGEN SATURATION: 97 % | HEIGHT: 67 IN | RESPIRATION RATE: 18 BRPM | WEIGHT: 229 LBS

## 2024-05-30 DIAGNOSIS — E78.5 HYPERLIPIDEMIA LDL GOAL <100: ICD-10-CM

## 2024-05-30 DIAGNOSIS — J45.40 MODERATE PERSISTENT ASTHMA WITHOUT COMPLICATION: ICD-10-CM

## 2024-05-30 DIAGNOSIS — I10 BENIGN ESSENTIAL HYPERTENSION: Primary | ICD-10-CM

## 2024-05-30 PROCEDURE — G2211 COMPLEX E/M VISIT ADD ON: HCPCS | Performed by: STUDENT IN AN ORGANIZED HEALTH CARE EDUCATION/TRAINING PROGRAM

## 2024-05-30 PROCEDURE — 99214 OFFICE O/P EST MOD 30 MIN: CPT | Performed by: STUDENT IN AN ORGANIZED HEALTH CARE EDUCATION/TRAINING PROGRAM

## 2024-05-30 RX ORDER — ALBUTEROL SULFATE 90 UG/1
1-2 AEROSOL, METERED RESPIRATORY (INHALATION) EVERY 6 HOURS PRN
Qty: 18 G | Refills: 3 | Status: SHIPPED | OUTPATIENT
Start: 2024-05-30 | End: 2024-09-09

## 2024-05-30 RX ORDER — LOSARTAN POTASSIUM 100 MG/1
100 TABLET ORAL DAILY
Qty: 90 TABLET | Refills: 3 | Status: SHIPPED | OUTPATIENT
Start: 2024-05-30 | End: 2024-06-29

## 2024-05-30 ASSESSMENT — PAIN SCALES - GENERAL: PAINLEVEL: NO PAIN (0)

## 2024-05-30 NOTE — PROGRESS NOTES
"  Assessment & Plan     Benign essential hypertension  Patient is a pleasant 58 year old who presents today for follow up on blood pressure. Last dose of losartan was yesterday. Was not able to taper up as ordered due to limited supply dispensed by pharmacy. Plan to increase to 100 mg. She will message in 3 weeks with home BP numbers. If still not at goal, plan to add hydrochlorothiazide.   - losartan (COZAAR) 100 MG tablet  Dispense: 90 tablet; Refill: 3    Moderate persistent asthma without complication  Refill.   - albuterol (PROAIR HFA/PROVENTIL HFA/VENTOLIN HFA) 108 (90 Base) MCG/ACT inhaler  Dispense: 18 g; Refill: 3    Hyperlipidemia LDL goal <100  We reviewed [facility] Statin Decision Aid today. Recommended statin. However, patient is working on lifestyle changes and will plan recheck cholesterol fasting in 3 months.   - lipid panel - fasting    The longitudinal plan of care for the diagnosis(es)/condition(s) as documented were addressed during this visit. Due to the added complexity in care, I will continue to support Yanira in the subsequent management and with ongoing continuity of care.    BMI  Estimated body mass index is 35.87 kg/m  as calculated from the following:    Height as of this encounter: 1.702 m (5' 7\").    Weight as of this encounter: 103.9 kg (229 lb).       Subjective   Yanira is a 58 year old, presenting for the following health issues:  Results (Labs and MRI) and Hypertension        5/30/2024    10:24 AM   Additional Questions   Roomed by Clarice WEIR   Accompanied by Self     History of Present Illness       Reason for visit:  Follow up    She eats 2-3 servings of fruits and vegetables daily.She consumes 2 sweetened beverage(s) daily.She exercises with enough effort to increase her heart rate 9 or less minutes per day.  She exercises with enough effort to increase her heart rate 4 days per week. She is missing 1 dose(s) of medications per week.     Hypertension Follow-up    Do you check " "your blood pressure regularly outside of the clinic? Yes   Are you following a low salt diet? Yes  Are your blood pressures ever more than 140 on the top number (systolic) OR more   than 90 on the bottom number (diastolic), for example 140/90? Yes    Blood pressures 156-160/80-90  Primarily when on 1 tablet.   The 10-year ASCVD risk score (Rose MCKINNON, et al., 2019) is: 17.2%    Values used to calculate the score:      Age: 58 years      Sex: Female      Is Non- : No      Diabetic: No      Tobacco smoker: Yes      Systolic Blood Pressure: 170 mmHg      Is BP treated: Yes      HDL Cholesterol: 37 mg/dL      Total Cholesterol: 197 mg/dL      Review of Systems  Constitutional, HEENT, cardiovascular, pulmonary, gi and gu systems are negative, except as otherwise noted.      Objective    BP (!) 170/100 (BP Location: Right arm, Patient Position: Sitting, Cuff Size: Adult Regular)   Pulse 68   Temp 97.9  F (36.6  C) (Tympanic)   Resp 18   Ht 1.702 m (5' 7\")   Wt 103.9 kg (229 lb)   SpO2 97%   BMI 35.87 kg/m    Body mass index is 35.87 kg/m .  Physical Exam  Constitutional:       Appearance: Normal appearance.   HENT:      Head: Normocephalic.   Eyes:      General: No scleral icterus.     Extraocular Movements: Extraocular movements intact.      Conjunctiva/sclera: Conjunctivae normal.   Cardiovascular:      Rate and Rhythm: Normal rate.   Pulmonary:      Effort: Pulmonary effort is normal.   Neurological:      General: No focal deficit present.      Mental Status: She is alert and oriented to person, place, and time.                Signed Electronically by: Alyx Tanner MD    "

## 2024-06-04 ENCOUNTER — OFFICE VISIT (OUTPATIENT)
Dept: URGENT CARE | Facility: URGENT CARE | Age: 59
End: 2024-06-04
Payer: COMMERCIAL

## 2024-06-04 VITALS
BODY MASS INDEX: 36.02 KG/M2 | OXYGEN SATURATION: 96 % | WEIGHT: 230 LBS | DIASTOLIC BLOOD PRESSURE: 91 MMHG | RESPIRATION RATE: 18 BRPM | SYSTOLIC BLOOD PRESSURE: 161 MMHG | HEART RATE: 57 BPM | TEMPERATURE: 97.6 F

## 2024-06-04 DIAGNOSIS — H65.91 OME (OTITIS MEDIA WITH EFFUSION), RIGHT: Primary | ICD-10-CM

## 2024-06-04 DIAGNOSIS — R59.0 POSTERIOR AURICULAR LYMPHADENOPATHY: ICD-10-CM

## 2024-06-04 PROCEDURE — 99213 OFFICE O/P EST LOW 20 MIN: CPT | Performed by: PHYSICIAN ASSISTANT

## 2024-06-04 RX ORDER — AMOXICILLIN 875 MG
875 TABLET ORAL 2 TIMES DAILY
Qty: 14 TABLET | Refills: 0 | Status: SHIPPED | OUTPATIENT
Start: 2024-06-04 | End: 2024-06-11

## 2024-06-04 ASSESSMENT — ENCOUNTER SYMPTOMS
SORE THROAT: 0
ADENOPATHY: 1
FEVER: 0

## 2024-06-04 NOTE — PROGRESS NOTES
SUBJECTIVE:   Yanira Jaime is a 58 year old female presenting with a chief complaint of   Chief Complaint   Patient presents with    Ear Problem     Says swollen lymph node behind left ear, started yesterday morning.        She is an established patient of [facility].  Patient presents with left ear lymphadenopathy.  No fevers, no head cuts/bruises.  Left ear pain as well.  Symptoms since yesterday.      Treatment:  tylenol        Review of Systems   Constitutional:  Negative for fever.   HENT:  Negative for ear pain and sore throat.    Hematological:  Positive for adenopathy.   All other systems reviewed and are negative.      No past medical history on file.  Family History   Problem Relation Age of Onset    Peptic Ulcer Disease Mother         perioperatively  when trying to fix this.    CABG Father     Hypertension Father     Hyperlipidemia Father      Current Outpatient Medications   Medication Sig Dispense Refill    albuterol (PROAIR HFA/PROVENTIL HFA/VENTOLIN HFA) 108 (90 Base) MCG/ACT inhaler Inhale 1-2 puffs into the lungs every 6 hours as needed for shortness of breath, wheezing or cough 18 g 3    amoxicillin (AMOXIL) 875 MG tablet Take 1 tablet (875 mg) by mouth 2 times daily for 7 days 14 tablet 0    budesonide-formoterol (SYMBICORT) 160-4.5 MCG/ACT Inhaler Inhale 2 puffs once daily plus 1-2 puffs as needed. May use up to 12 puffs per day. 20.4 g 11    losartan (COZAAR) 100 MG tablet Take 1 tablet (100 mg) by mouth daily 90 tablet 3    atorvastatin (LIPITOR) 20 MG tablet Take 1 tablet (20 mg) by mouth daily for 30 days, THEN 2 tablets (40 mg) daily for 60 days. (Patient not taking: Reported on 2024) 150 tablet 0     Social History     Tobacco Use    Smoking status: Every Day     Current packs/day: 0.50     Average packs/day: 0.5 packs/day for 37.0 years (18.5 ttl pk-yrs)     Types: Cigarettes    Smokeless tobacco: Never   Substance Use Topics    Alcohol use: Yes     Comment: 3-4 drinks twice a  week; beer       OBJECTIVE  BP (!) 161/91   Pulse 57   Temp 97.6  F (36.4  C) (Tympanic)   Resp 18   Wt 104.3 kg (230 lb)   SpO2 96%   BMI 36.02 kg/m      Physical Exam  Vitals and nursing note reviewed.   Constitutional:       General: She is not in acute distress.     Appearance: Normal appearance. She is obese. She is not ill-appearing or toxic-appearing.   HENT:      Right Ear: Tympanic membrane, ear canal and external ear normal.      Left Ear: Ear canal and external ear normal.      Ears:      Comments: Right TM erythema.     Left post auricular lymphadenopathy.  Tender to palpation.  Cardiovascular:      Rate and Rhythm: Normal rate.   Neurological:      Mental Status: She is alert.         Labs:  No results found for this or any previous visit (from the past 24 hour(s)).      ASSESSMENT:      ICD-10-CM    1. OME (otitis media with effusion), right  H65.91 amoxicillin (AMOXIL) 875 MG tablet           Medical Decision Making:    Differential Diagnosis:  URI Adult/Peds:  Acute right otitis media, Acute left otitis media, and Viral upper respiratory illness    Serious Comorbid Conditions:  Adult:   reviewed    PLAN:    Rx for amoxicillin.  Discussed not touching the lymph node.  She has no right ear pain.  Discussed reasons to seek immediate medical attention.  Additionally if no improvement or worsening in one week, may follow up with PCP and/or UC.        Followup:    If not improving or if condition worsens, follow up with your Primary Care Provider, If not improving or if conditions worsens over the next 12-24 hours, go to the Emergency Department    There are no Patient Instructions on file for this visit.

## 2024-06-20 ENCOUNTER — MYC MEDICAL ADVICE (OUTPATIENT)
Dept: FAMILY MEDICINE | Facility: CLINIC | Age: 59
End: 2024-06-20
Payer: COMMERCIAL

## 2024-06-20 DIAGNOSIS — I10 BENIGN ESSENTIAL HYPERTENSION: Primary | ICD-10-CM

## 2024-06-21 NOTE — TELEPHONE ENCOUNTER
BP Readings from Last 6 Encounters:   06/04/24 (!) 161/91   05/30/24 (!) 170/100   04/23/24 (!) 162/90   04/01/24 (!) 148/86   08/19/22 (!) 162/80   11/15/19 (!) 145/85

## 2024-06-24 RX ORDER — CHLORTHALIDONE 25 MG/1
25 TABLET ORAL DAILY
Qty: 90 TABLET | Refills: 3 | Status: SHIPPED | OUTPATIENT
Start: 2024-06-24 | End: 2024-06-24

## 2024-06-24 RX ORDER — CHLORTHALIDONE 25 MG/1
25 TABLET ORAL DAILY
Qty: 90 TABLET | Refills: 3 | Status: SHIPPED | OUTPATIENT
Start: 2024-06-24 | End: 2024-09-09

## 2024-06-29 ENCOUNTER — MYC REFILL (OUTPATIENT)
Dept: FAMILY MEDICINE | Facility: CLINIC | Age: 59
End: 2024-06-29
Payer: COMMERCIAL

## 2024-06-29 DIAGNOSIS — J45.40 MODERATE PERSISTENT ASTHMA WITHOUT COMPLICATION: ICD-10-CM

## 2024-06-29 DIAGNOSIS — I10 BENIGN ESSENTIAL HYPERTENSION: ICD-10-CM

## 2024-07-01 RX ORDER — BUDESONIDE AND FORMOTEROL FUMARATE DIHYDRATE 160; 4.5 UG/1; UG/1
AEROSOL RESPIRATORY (INHALATION)
Qty: 20.4 G | Refills: 11 | Status: SHIPPED | OUTPATIENT
Start: 2024-07-01

## 2024-07-01 RX ORDER — LOSARTAN POTASSIUM 100 MG/1
100 TABLET ORAL DAILY
Qty: 90 TABLET | Refills: 3 | Status: SHIPPED | OUTPATIENT
Start: 2024-07-01 | End: 2024-09-09

## 2024-07-06 ENCOUNTER — OFFICE VISIT (OUTPATIENT)
Dept: URGENT CARE | Facility: URGENT CARE | Age: 59
End: 2024-07-06
Payer: COMMERCIAL

## 2024-07-06 VITALS
OXYGEN SATURATION: 96 % | TEMPERATURE: 97.6 F | RESPIRATION RATE: 16 BRPM | WEIGHT: 230 LBS | DIASTOLIC BLOOD PRESSURE: 89 MMHG | BODY MASS INDEX: 36.02 KG/M2 | HEART RATE: 87 BPM | SYSTOLIC BLOOD PRESSURE: 139 MMHG

## 2024-07-06 DIAGNOSIS — H66.001 ACUTE SUPPURATIVE OTITIS MEDIA OF RIGHT EAR WITHOUT SPONTANEOUS RUPTURE OF TYMPANIC MEMBRANE, RECURRENCE NOT SPECIFIED: Primary | ICD-10-CM

## 2024-07-06 DIAGNOSIS — J45.40 MODERATE PERSISTENT ASTHMA WITHOUT COMPLICATION: ICD-10-CM

## 2024-07-06 PROCEDURE — 99213 OFFICE O/P EST LOW 20 MIN: CPT | Performed by: PHYSICIAN ASSISTANT

## 2024-07-06 RX ORDER — BUDESONIDE AND FORMOTEROL FUMARATE DIHYDRATE 160; 4.5 UG/1; UG/1
AEROSOL RESPIRATORY (INHALATION)
Refills: 0 | OUTPATIENT
Start: 2024-07-06

## 2024-07-06 RX ORDER — AMOXICILLIN 875 MG
875 TABLET ORAL 2 TIMES DAILY
Qty: 20 TABLET | Refills: 0 | Status: SHIPPED | OUTPATIENT
Start: 2024-07-06 | End: 2024-07-16

## 2024-07-06 ASSESSMENT — ENCOUNTER SYMPTOMS
WEAKNESS: 0
COUGH: 0
VOMITING: 0
ENDOCRINE NEGATIVE: 1
HEADACHES: 0
WOUND: 0
ARTHRALGIAS: 0
DIZZINESS: 0
CHILLS: 0
NECK PAIN: 0
BACK PAIN: 0
MUSCULOSKELETAL NEGATIVE: 1
RHINORRHEA: 0
RESPIRATORY NEGATIVE: 1
EYES NEGATIVE: 1
LIGHT-HEADEDNESS: 0
DIARRHEA: 0
MYALGIAS: 0
NAUSEA: 0
JOINT SWELLING: 0
FEVER: 0
CARDIOVASCULAR NEGATIVE: 1
SHORTNESS OF BREATH: 0
NECK STIFFNESS: 0
PALPITATIONS: 0
SORE THROAT: 0
ALLERGIC/IMMUNOLOGIC NEGATIVE: 1

## 2024-07-06 NOTE — PROGRESS NOTES
Chief Complaint:     Chief Complaint   Patient presents with    Otalgia     Right ear pain x 2 day       No results found for any visits on 07/06/24.    Medical Decision Making:    Vital signs reviewed by Kevin Rodríguez PA-C  /89   Pulse 87   Temp 97.6  F (36.4  C) (Tympanic)   Resp 16   Wt 104.3 kg (230 lb)   SpO2 96%   BMI 36.02 kg/m      Differential Diagnosis:  URI Adult/Peds:  Acute right otitis media, Viral syndrome, and Viral upper respiratory illness        ASSESSMENT    1. Acute suppurative otitis media of right ear without spontaneous rupture of tympanic membrane, recurrence not specified        PLAN    Patient is in no acute distress.    Temp is 97.6 in clinic today, lung sounds were clear, and O2 sats at 96% on RA.    Rx for Amoxicillin for ear infection.    Rest, Push fluids, vaporizer, elevation of head of bed.  Ibuprofen and or Tylenol for any fever or body aches.  If symptoms worsen, recheck immediately otherwise follow up with your PCP in 1 week if symptoms are not improving.  Worrisome symptoms discussed with instructions to go to the ED.  Patient verbalized understanding and agreed with this plan.    Labs:    No results found for any visits on 07/06/24.     Vital signs reviewed by Kevin Rodríguez PA-C  /89   Pulse 87   Temp 97.6  F (36.4  C) (Tympanic)   Resp 16   Wt 104.3 kg (230 lb)   SpO2 96%   BMI 36.02 kg/m      Current Meds      Current Outpatient Medications:     albuterol (PROAIR HFA/PROVENTIL HFA/VENTOLIN HFA) 108 (90 Base) MCG/ACT inhaler, Inhale 1-2 puffs into the lungs every 6 hours as needed for shortness of breath, wheezing or cough, Disp: 18 g, Rfl: 3    amoxicillin (AMOXIL) 875 MG tablet, Take 1 tablet (875 mg) by mouth 2 times daily for 10 days, Disp: 20 tablet, Rfl: 0    budesonide-formoterol (SYMBICORT) 160-4.5 MCG/ACT Inhaler, Inhale 2 puffs once daily plus 1-2 puffs as needed. May use up to 12 puffs per day., Disp: 20.4 g, Rfl: 11    chlorthalidone  (HYGROTON) 25 MG tablet, Take 1 tablet (25 mg) by mouth daily, Disp: 90 tablet, Rfl: 3    losartan (COZAAR) 100 MG tablet, Take 1 tablet (100 mg) by mouth daily, Disp: 90 tablet, Rfl: 3    atorvastatin (LIPITOR) 20 MG tablet, Take 1 tablet (20 mg) by mouth daily for 30 days, THEN 2 tablets (40 mg) daily for 60 days. (Patient not taking: Reported on 2024), Disp: 150 tablet, Rfl: 0      Respiratory History    occasional episodes of bronchitis      SUBJECTIVE    HPI: Yanira Jaime is an 58 year old female who presents with ear pain right.  Symptoms began 2  days ago and has unchanged.  There is no shortness of breath, wheezing, and chest pain.  Patient is eating and drinking well.  No fever, nausea, vomiting, or diarrhea.    Patient denies any recent travel or exposure to known COVID positive tested individual.      ROS:     Review of Systems   Constitutional:  Negative for chills and fever.   HENT:  Positive for ear pain. Negative for congestion, rhinorrhea and sore throat.    Eyes: Negative.    Respiratory: Negative.  Negative for cough and shortness of breath.    Cardiovascular: Negative.  Negative for chest pain and palpitations.   Gastrointestinal:  Negative for diarrhea, nausea and vomiting.   Endocrine: Negative.    Genitourinary: Negative.    Musculoskeletal: Negative.  Negative for arthralgias, back pain, joint swelling, myalgias, neck pain and neck stiffness.   Skin: Negative.  Negative for rash and wound.   Allergic/Immunologic: Negative.  Negative for immunocompromised state.   Neurological:  Negative for dizziness, weakness, light-headedness and headaches.         Family History   Family History   Problem Relation Age of Onset    Peptic Ulcer Disease Mother         perioperatively  when trying to fix this.    CABG Father     Hypertension Father     Hyperlipidemia Father         Problem history  Patient Active Problem List   Diagnosis    Tobacco use disorder        Allergies  No Known Allergies      Social History  Social History     Socioeconomic History    Marital status:      Spouse name: Not on file    Number of children: Not on file    Years of education: Not on file    Highest education level: Not on file   Occupational History    Not on file   Tobacco Use    Smoking status: Every Day     Current packs/day: 0.50     Average packs/day: 0.5 packs/day for 37.0 years (18.5 ttl pk-yrs)     Types: Cigarettes    Smokeless tobacco: Never   Substance and Sexual Activity    Alcohol use: Yes     Comment: 3-4 drinks twice a week; beer    Drug use: Not Currently    Sexual activity: Not Currently     Partners: Male   Other Topics Concern    Not on file   Social History Narrative    Not on file     Social Determinants of Health     Financial Resource Strain: High Risk (1/1/2022)    [facility] [facility]    Financial Resource Strain     Difficulty of Paying Living Expenses: Not on file     Difficulty of Paying Living Expenses: Not on file   Food Insecurity: Not on file   Transportation Needs: Not on file   Physical Activity: Not on file   Stress: Not on file   Social Connections: Unknown (1/1/2022)    Received from [facility]    Social Connections     Frequency of Communication with Friends and Family: Not on file   Interpersonal Safety: Low Risk  (4/23/2024)    Interpersonal Safety     Do you feel physically and emotionally safe where you currently live?: Yes     Within the past 12 months, have you been hit, slapped, kicked or otherwise physically hurt by someone?: No     Within the past 12 months, have you been humiliated or emotionally abused in other ways by your partner or ex-partner?: No   Housing Stability: Not on file        OBJECTIVE     Vital signs reviewed by Kevin Rodríguez PA-C  /89   Pulse 87   Temp 97.6  F (36.4  C) (Tympanic)    Resp 16   Wt 104.3 kg (230 lb)   SpO2 96%   BMI 36.02 kg/m       Physical Exam  Vitals and nursing note reviewed.   Constitutional:       General: She is not in acute distress.     Appearance: She is well-developed. She is not ill-appearing, toxic-appearing or diaphoretic.   HENT:      Head: Normocephalic and atraumatic.      Right Ear: Hearing, ear canal and external ear normal. No drainage, swelling or tenderness. Tympanic membrane is erythematous. Tympanic membrane is not perforated, retracted or bulging.      Left Ear: Hearing, tympanic membrane, ear canal and external ear normal. No drainage, swelling or tenderness. Tympanic membrane is not perforated, erythematous, retracted or bulging.      Nose: Congestion present. No mucosal edema or rhinorrhea.      Right Sinus: No maxillary sinus tenderness or frontal sinus tenderness.      Left Sinus: No maxillary sinus tenderness or frontal sinus tenderness.      Mouth/Throat:      Pharynx: No pharyngeal swelling, oropharyngeal exudate, posterior oropharyngeal erythema or uvula swelling.      Tonsils: No tonsillar exudate or tonsillar abscesses. 0 on the right. 0 on the left.   Eyes:      General:         Right eye: No discharge.         Left eye: No discharge.      Pupils: Pupils are equal, round, and reactive to light.   Cardiovascular:      Rate and Rhythm: Normal rate and regular rhythm.      Heart sounds: Normal heart sounds. No murmur heard.     No friction rub. No gallop.   Pulmonary:      Effort: Pulmonary effort is normal. No respiratory distress.      Breath sounds: Normal breath sounds. No decreased breath sounds, wheezing, rhonchi or rales.   Chest:      Chest wall: No tenderness.   Abdominal:      General: Bowel sounds are normal. There is no distension.      Palpations: Abdomen is soft. There is no mass.      Tenderness: There is no abdominal tenderness. There is no guarding.   Musculoskeletal:      Cervical back: Normal range of motion and neck  supple.   Lymphadenopathy:      Head:      Right side of head: No submental, submandibular, tonsillar, preauricular or posterior auricular adenopathy.      Left side of head: No submental, submandibular, tonsillar, preauricular or posterior auricular adenopathy.      Cervical: No cervical adenopathy.      Right cervical: No superficial or posterior cervical adenopathy.     Left cervical: No superficial or posterior cervical adenopathy.   Skin:     General: Skin is warm and dry.      Findings: No rash.   Neurological:      Mental Status: She is alert and oriented to person, place, and time.      Cranial Nerves: No cranial nerve deficit.      Deep Tendon Reflexes: Reflexes are normal and symmetric.   Psychiatric:         Behavior: Behavior normal. Behavior is cooperative.         Thought Content: Thought content normal.         Judgment: Judgment normal.           Kevin Rodríguez PA-C  7/6/2024, 2:15 PM

## 2024-07-16 ENCOUNTER — MYC MEDICAL ADVICE (OUTPATIENT)
Dept: FAMILY MEDICINE | Facility: CLINIC | Age: 59
End: 2024-07-16
Payer: COMMERCIAL

## 2024-07-28 ENCOUNTER — MYC REFILL (OUTPATIENT)
Dept: FAMILY MEDICINE | Facility: CLINIC | Age: 59
End: 2024-07-28
Payer: COMMERCIAL

## 2024-07-28 DIAGNOSIS — J45.40 MODERATE PERSISTENT ASTHMA WITHOUT COMPLICATION: ICD-10-CM

## 2024-07-28 DIAGNOSIS — I10 BENIGN ESSENTIAL HYPERTENSION: ICD-10-CM

## 2024-07-29 RX ORDER — BUDESONIDE AND FORMOTEROL FUMARATE DIHYDRATE 160; 4.5 UG/1; UG/1
AEROSOL RESPIRATORY (INHALATION)
Qty: 20.4 G | Refills: 11 | OUTPATIENT
Start: 2024-07-29

## 2024-07-29 RX ORDER — CHLORTHALIDONE 25 MG/1
25 TABLET ORAL DAILY
Qty: 90 TABLET | Refills: 3 | OUTPATIENT
Start: 2024-07-29

## 2024-07-29 RX ORDER — ALBUTEROL SULFATE 90 UG/1
1-2 AEROSOL, METERED RESPIRATORY (INHALATION) EVERY 6 HOURS PRN
Qty: 18 G | Refills: 3 | OUTPATIENT
Start: 2024-07-29

## 2024-07-29 RX ORDER — LOSARTAN POTASSIUM 100 MG/1
100 TABLET ORAL DAILY
Qty: 90 TABLET | Refills: 3 | OUTPATIENT
Start: 2024-07-29

## 2024-09-09 ENCOUNTER — MYC REFILL (OUTPATIENT)
Dept: FAMILY MEDICINE | Facility: CLINIC | Age: 59
End: 2024-09-09
Payer: COMMERCIAL

## 2024-09-09 DIAGNOSIS — I10 BENIGN ESSENTIAL HYPERTENSION: ICD-10-CM

## 2024-09-09 DIAGNOSIS — J45.40 MODERATE PERSISTENT ASTHMA WITHOUT COMPLICATION: ICD-10-CM

## 2024-09-10 RX ORDER — LOSARTAN POTASSIUM 100 MG/1
100 TABLET ORAL DAILY
Qty: 90 TABLET | Refills: 1 | Status: SHIPPED | OUTPATIENT
Start: 2024-09-10

## 2024-09-10 RX ORDER — CHLORTHALIDONE 25 MG/1
25 TABLET ORAL DAILY
Qty: 90 TABLET | Refills: 1 | Status: SHIPPED | OUTPATIENT
Start: 2024-09-10

## 2024-09-10 RX ORDER — ALBUTEROL SULFATE 90 UG/1
1-2 AEROSOL, METERED RESPIRATORY (INHALATION) EVERY 6 HOURS PRN
Qty: 18 G | Refills: 0 | Status: SHIPPED | OUTPATIENT
Start: 2024-09-10

## 2024-09-10 ASSESSMENT — ASTHMA QUESTIONNAIRES
QUESTION_1 LAST FOUR WEEKS HOW MUCH OF THE TIME DID YOUR ASTHMA KEEP YOU FROM GETTING AS MUCH DONE AT WORK, SCHOOL OR AT HOME: MOST OF THE TIME
ACT_TOTALSCORE: 11
QUESTION_5 LAST FOUR WEEKS HOW WOULD YOU RATE YOUR ASTHMA CONTROL: SOMEWHAT CONTROLLED
QUESTION_2 LAST FOUR WEEKS HOW OFTEN HAVE YOU HAD SHORTNESS OF BREATH: MORE THAN ONCE A DAY
QUESTION_4 LAST FOUR WEEKS HOW OFTEN HAVE YOU USED YOUR RESCUE INHALER OR NEBULIZER MEDICATION (SUCH AS ALBUTEROL): ONE OR TWO TIMES PER DAY
QUESTION_3 LAST FOUR WEEKS HOW OFTEN DID YOUR ASTHMA SYMPTOMS (WHEEZING, COUGHING, SHORTNESS OF BREATH, CHEST TIGHTNESS OR PAIN) WAKE YOU UP AT NIGHT OR EARLIER THAN USUAL IN THE MORNING: ONCE A WEEK
ACT_TOTALSCORE: 11

## 2024-09-11 ENCOUNTER — MYC REFILL (OUTPATIENT)
Dept: FAMILY MEDICINE | Facility: CLINIC | Age: 59
End: 2024-09-11
Payer: COMMERCIAL

## 2024-09-11 DIAGNOSIS — J45.40 MODERATE PERSISTENT ASTHMA WITHOUT COMPLICATION: ICD-10-CM

## 2024-09-12 RX ORDER — ALBUTEROL SULFATE 90 UG/1
1-2 AEROSOL, METERED RESPIRATORY (INHALATION) EVERY 6 HOURS PRN
Qty: 18 G | Refills: 0 | OUTPATIENT
Start: 2024-09-12

## 2024-09-26 ENCOUNTER — TELEPHONE (OUTPATIENT)
Dept: FAMILY MEDICINE | Facility: CLINIC | Age: 59
End: 2024-09-26
Payer: COMMERCIAL

## 2024-09-26 NOTE — TELEPHONE ENCOUNTER
Patient Quality Outreach    Patient is due for the following:   Breast Cancer Screening - Mammogram    Next Steps:   Pt scheduled for 11/06/2024    Type of outreach:    Chart review performed, no outreach needed.      Questions for provider review:    None           Clarice Diaz MA

## 2024-10-22 ENCOUNTER — OFFICE VISIT (OUTPATIENT)
Dept: URGENT CARE | Facility: URGENT CARE | Age: 59
End: 2024-10-22
Payer: COMMERCIAL

## 2024-10-22 ENCOUNTER — MYC REFILL (OUTPATIENT)
Dept: FAMILY MEDICINE | Facility: CLINIC | Age: 59
End: 2024-10-22

## 2024-10-22 VITALS
BODY MASS INDEX: 36.96 KG/M2 | SYSTOLIC BLOOD PRESSURE: 163 MMHG | TEMPERATURE: 98.2 F | OXYGEN SATURATION: 96 % | RESPIRATION RATE: 18 BRPM | DIASTOLIC BLOOD PRESSURE: 94 MMHG | WEIGHT: 236 LBS | HEART RATE: 68 BPM

## 2024-10-22 DIAGNOSIS — J45.40 MODERATE PERSISTENT ASTHMA WITHOUT COMPLICATION: ICD-10-CM

## 2024-10-22 DIAGNOSIS — M10.9 ACUTE GOUT OF RIGHT ANKLE, UNSPECIFIED CAUSE: Primary | ICD-10-CM

## 2024-10-22 PROCEDURE — 99213 OFFICE O/P EST LOW 20 MIN: CPT | Performed by: PHYSICIAN ASSISTANT

## 2024-10-22 RX ORDER — ALBUTEROL SULFATE 90 UG/1
INHALANT RESPIRATORY (INHALATION)
Qty: 8.5 G | Refills: 0 | Status: SHIPPED | OUTPATIENT
Start: 2024-10-22 | End: 2024-11-11

## 2024-10-22 RX ORDER — PREDNISONE 20 MG/1
40 TABLET ORAL DAILY
Qty: 10 TABLET | Refills: 0 | Status: SHIPPED | OUTPATIENT
Start: 2024-10-22 | End: 2024-10-27

## 2024-10-22 RX ORDER — ALBUTEROL SULFATE 90 UG/1
1-2 INHALANT RESPIRATORY (INHALATION) EVERY 6 HOURS PRN
Qty: 18 G | Refills: 0 | OUTPATIENT
Start: 2024-10-22

## 2024-10-22 NOTE — TELEPHONE ENCOUNTER
Routing refill request to provider for review/approval because:  Labs out of range:        9/10/2024     4:07 PM   ACT Total Scores   ACT TOTAL SCORE (Goal Greater than or Equal to 20) 11   In the past 12 months, how many times did you visit the emergency room for your asthma without being admitted to the hospital? 0   In the past 12 months, how many times were you hospitalized overnight because of your asthma? 0   Last Written Prescription Date:  9/10/24  Last Fill Quantity: 18 gm,  # refills: 0   Last office visit: 5/30/2024 ; last virtual visit: Visit date not found with prescribing provider:     Future Office Visit:      Julie Behrendt RN

## 2024-10-22 NOTE — PROGRESS NOTES
"  Assessment & Plan     Acute gout of right ankle, unspecified cause  Symptoms consistent with gout. Will treat with prednisone burst. Would recommend follow up with primary care provider to discuss preventative medications.     - predniSONE (DELTASONE) 20 MG tablet; Take 2 tablets (40 mg) by mouth daily for 5 days.          BMI  Estimated body mass index is 36.96 kg/m  as calculated from the following:    Height as of 5/30/24: 1.702 m (5' 7\").    Weight as of this encounter: 107 kg (236 lb).             Return in about 1 week (around 10/29/2024), or if symptoms worsen or fail to improve.    Subjective   Chief Complaint   Patient presents with    Ankle Pain     4 days ago pt woke with right ankle pain, she said \"I could hardly walk\". Pain is all around the ankle and it hurts when she moves her toes, hard to drive too.  Pt works on her feet all day and it's getting worse.  NOTE:  pt has hx of gout on her toes.       HPI     Ankle pain     Onset of symptoms was 1 day(s) ago.  Course of illness is same.    Severity moderate  Current and Associated symptoms: right ankle pain and swelling, no injury   Treatment measures tried include Tylenol/Ibuprofen.  Predisposing factors include None.                      Objective    BP (!) 163/94   Pulse 68   Temp 98.2  F (36.8  C) (Tympanic)   Resp 18   Wt 107 kg (236 lb)   SpO2 96%   BMI 36.96 kg/m    Body mass index is 36.96 kg/m .  Physical Exam  Constitutional:       General: She is not in acute distress.  Musculoskeletal:      Comments: Right ankle has diffuse swelling and moderate tenderness with palpation. Decreased ROM due to pain.    Neurological:      Mental Status: She is alert.                    Signed Electronically by: Nallely Ochoa PA-C    "

## 2024-11-06 ENCOUNTER — ANCILLARY PROCEDURE (OUTPATIENT)
Dept: MAMMOGRAPHY | Facility: CLINIC | Age: 59
End: 2024-11-06
Attending: STUDENT IN AN ORGANIZED HEALTH CARE EDUCATION/TRAINING PROGRAM
Payer: COMMERCIAL

## 2024-11-06 DIAGNOSIS — Z12.31 VISIT FOR SCREENING MAMMOGRAM: ICD-10-CM

## 2024-11-06 PROCEDURE — 77067 SCR MAMMO BI INCL CAD: CPT | Mod: TC | Performed by: RADIOLOGY

## 2024-11-06 PROCEDURE — 77063 BREAST TOMOSYNTHESIS BI: CPT | Mod: TC | Performed by: RADIOLOGY

## 2024-11-10 DIAGNOSIS — J45.40 MODERATE PERSISTENT ASTHMA WITHOUT COMPLICATION: ICD-10-CM

## 2024-11-11 RX ORDER — ALBUTEROL SULFATE 90 UG/1
1-2 INHALANT RESPIRATORY (INHALATION) EVERY 6 HOURS PRN
Qty: 8.5 G | Refills: 5 | Status: SHIPPED | OUTPATIENT
Start: 2024-11-11

## 2024-11-11 NOTE — TELEPHONE ENCOUNTER
"Requested Prescriptions   Pending Prescriptions Disp Refills    albuterol (PROAIR HFA/PROVENTIL HFA/VENTOLIN HFA) 108 (90 Base) MCG/ACT inhaler [Pharmacy Med Name: ALBUTEROL(PROAIR) HFA INHALER 90MCG]  0       Short-Acting Beta Agonist Inhalers Protocol  Failed - 11/11/2024  3:10 PM        Failed - Asthma control assessment score within normal limits in last 6 months     Please review ACT score.           Passed - Patient is age 12 or older        Passed - Medication is active on med list        Passed - Recent (6 mo) or future (30 days) visit within the authorizing provider's specialty     Patient had office visit in the last 6 months or has a visit in the next 30 days with authorizing provider or within the authorizing provider's specialty.  See \"Patient Info\" tab in inbasket, or \"Choose Columns\" in Meds & Orders section of the refill encounter.                 "

## 2025-02-19 ENCOUNTER — OFFICE VISIT (OUTPATIENT)
Dept: FAMILY MEDICINE | Facility: CLINIC | Age: 60
End: 2025-02-19
Payer: COMMERCIAL

## 2025-02-19 VITALS
WEIGHT: 238 LBS | TEMPERATURE: 98.2 F | SYSTOLIC BLOOD PRESSURE: 170 MMHG | OXYGEN SATURATION: 99 % | BODY MASS INDEX: 37.35 KG/M2 | HEIGHT: 67 IN | HEART RATE: 72 BPM | DIASTOLIC BLOOD PRESSURE: 96 MMHG | RESPIRATION RATE: 18 BRPM

## 2025-02-19 DIAGNOSIS — J04.0 LARYNGITIS: Primary | ICD-10-CM

## 2025-02-19 DIAGNOSIS — I10 PRIMARY HYPERTENSION: ICD-10-CM

## 2025-02-19 DIAGNOSIS — E66.01 MORBID OBESITY (H): ICD-10-CM

## 2025-02-19 DIAGNOSIS — J45.30 MILD PERSISTENT ASTHMA WITHOUT COMPLICATION: ICD-10-CM

## 2025-02-19 DIAGNOSIS — F17.200 TOBACCO USE DISORDER: ICD-10-CM

## 2025-02-19 PROCEDURE — 99214 OFFICE O/P EST MOD 30 MIN: CPT | Performed by: FAMILY MEDICINE

## 2025-02-19 ASSESSMENT — ASTHMA QUESTIONNAIRES
QUESTION_5 LAST FOUR WEEKS HOW WOULD YOU RATE YOUR ASTHMA CONTROL: SOMEWHAT CONTROLLED
QUESTION_1 LAST FOUR WEEKS HOW MUCH OF THE TIME DID YOUR ASTHMA KEEP YOU FROM GETTING AS MUCH DONE AT WORK, SCHOOL OR AT HOME: MOST OF THE TIME
ACT_TOTALSCORE: 12
QUESTION_2 LAST FOUR WEEKS HOW OFTEN HAVE YOU HAD SHORTNESS OF BREATH: MORE THAN ONCE A DAY
ACT_TOTALSCORE: 12
QUESTION_3 LAST FOUR WEEKS HOW OFTEN DID YOUR ASTHMA SYMPTOMS (WHEEZING, COUGHING, SHORTNESS OF BREATH, CHEST TIGHTNESS OR PAIN) WAKE YOU UP AT NIGHT OR EARLIER THAN USUAL IN THE MORNING: NOT AT ALL
QUESTION_4 LAST FOUR WEEKS HOW OFTEN HAVE YOU USED YOUR RESCUE INHALER OR NEBULIZER MEDICATION (SUCH AS ALBUTEROL): THREE OR MORE TIMES PER DAY

## 2025-02-19 ASSESSMENT — PAIN SCALES - GENERAL: PAINLEVEL_OUTOF10: NO PAIN (0)

## 2025-02-19 NOTE — PROGRESS NOTES
"S :Yanira Jaime is a 59 year old female with laryngitis.  Lost her voice.  3 days ago.  No fever, some burning in throat.      Some congestion/cough    No sob    Off her BP meds.  Willing to restart and follow-up with primary in next month  Asthma: not on inhalers, cost too much  Morbid obesity: ongoing  Tobacco use: smoking    O:BP (!) 170/96   Pulse 72   Temp 98.2  F (36.8  C) (Tympanic)   Resp 18   Ht 1.702 m (5' 7\")   Wt 108 kg (238 lb)   SpO2 99%   BMI 37.28 kg/m    GEN: Alert and oriented, in no acute distress  CV: RRR, no murmur  RESP: lungs clear bilaterally, good effort  EXT: no edema or lesions noted in lower extremities  Neck: neck supple without mass or lymphadenopathy  ENT: oropharynx clear, no exudate or palate/tonsil asymmetry    A: laryngitis, likely viral      Htn, not controlled     Asthma: not controlled   Morbid obesity: ongoing  Tobacco use: smoking    P: back on meds.  Time/rest for throat.  Follow-up with primary soon, discuss inhaler options, BP meds/labs, etc.            "

## 2025-03-20 ENCOUNTER — TELEPHONE (OUTPATIENT)
Dept: FAMILY MEDICINE | Facility: CLINIC | Age: 60
End: 2025-03-20

## 2025-03-20 ENCOUNTER — OFFICE VISIT (OUTPATIENT)
Dept: FAMILY MEDICINE | Facility: CLINIC | Age: 60
End: 2025-03-20
Payer: COMMERCIAL

## 2025-03-20 VITALS
TEMPERATURE: 98.4 F | DIASTOLIC BLOOD PRESSURE: 78 MMHG | HEIGHT: 67 IN | HEART RATE: 76 BPM | BODY MASS INDEX: 35.85 KG/M2 | WEIGHT: 228.4 LBS | RESPIRATION RATE: 96 BRPM | SYSTOLIC BLOOD PRESSURE: 130 MMHG

## 2025-03-20 DIAGNOSIS — J37.0 CHRONIC LARYNGITIS: ICD-10-CM

## 2025-03-20 DIAGNOSIS — J45.40 MODERATE PERSISTENT ASTHMA WITHOUT COMPLICATION: Primary | ICD-10-CM

## 2025-03-20 DIAGNOSIS — I10 BENIGN ESSENTIAL HYPERTENSION: ICD-10-CM

## 2025-03-20 DIAGNOSIS — E78.5 HYPERLIPIDEMIA LDL GOAL <100: ICD-10-CM

## 2025-03-20 DIAGNOSIS — E66.01 CLASS 2 SEVERE OBESITY DUE TO EXCESS CALORIES WITH SERIOUS COMORBIDITY AND BODY MASS INDEX (BMI) OF 35.0 TO 35.9 IN ADULT (H): ICD-10-CM

## 2025-03-20 DIAGNOSIS — E66.812 CLASS 2 SEVERE OBESITY DUE TO EXCESS CALORIES WITH SERIOUS COMORBIDITY AND BODY MASS INDEX (BMI) OF 35.0 TO 35.9 IN ADULT (H): ICD-10-CM

## 2025-03-20 RX ORDER — FLUTICASONE PROPIONATE AND SALMETEROL 250; 50 UG/1; UG/1
1 POWDER RESPIRATORY (INHALATION) EVERY 12 HOURS
Qty: 60 EACH | Refills: 3 | Status: SHIPPED | OUTPATIENT
Start: 2025-03-20

## 2025-03-20 RX ORDER — ALBUTEROL SULFATE 90 UG/1
1-2 INHALANT RESPIRATORY (INHALATION) EVERY 6 HOURS PRN
Qty: 18 G | Refills: 5 | Status: SHIPPED | OUTPATIENT
Start: 2025-03-20

## 2025-03-20 ASSESSMENT — ASTHMA QUESTIONNAIRES
QUESTION_5 LAST FOUR WEEKS HOW WOULD YOU RATE YOUR ASTHMA CONTROL: SOMEWHAT CONTROLLED
QUESTION_2 LAST FOUR WEEKS HOW OFTEN HAVE YOU HAD SHORTNESS OF BREATH: THREE TO SIX TIMES A WEEK
QUESTION_3 LAST FOUR WEEKS HOW OFTEN DID YOUR ASTHMA SYMPTOMS (WHEEZING, COUGHING, SHORTNESS OF BREATH, CHEST TIGHTNESS OR PAIN) WAKE YOU UP AT NIGHT OR EARLIER THAN USUAL IN THE MORNING: NOT AT ALL
ACT_TOTALSCORE: 16
ACT_TOTALSCORE: 16
QUESTION_1 LAST FOUR WEEKS HOW MUCH OF THE TIME DID YOUR ASTHMA KEEP YOU FROM GETTING AS MUCH DONE AT WORK, SCHOOL OR AT HOME: SOME OF THE TIME
ACUTE_EXACERBATION_TODAY: NO
QUESTION_4 LAST FOUR WEEKS HOW OFTEN HAVE YOU USED YOUR RESCUE INHALER OR NEBULIZER MEDICATION (SUCH AS ALBUTEROL): ONE OR TWO TIMES PER DAY

## 2025-03-20 ASSESSMENT — PAIN SCALES - GENERAL: PAINLEVEL_OUTOF10: MODERATE PAIN (4)

## 2025-03-20 NOTE — NURSING NOTE
"Chief Complaint   Patient presents with    URI    Weight Check    Asthma       Initial There were no vitals taken for this visit. Estimated body mass index is 37.28 kg/m  as calculated from the following:    Height as of 2/19/25: 1.702 m (5' 7\").    Weight as of 2/19/25: 108 kg (238 lb).    Patient presents to the clinic using No DME    Is there anyone who you would like to be able to receive your results? No  If yes have patient fill out CHAITANY[facility]      "

## 2025-03-20 NOTE — TELEPHONE ENCOUNTER
Prior Authorization Retail Medication Request    Medication/Dose: Zepbound      Diagnosis and ICD code (if different than what is on RX):    New/renewal/insurance change PA/secondary ins. PA:  Previously Tried and Failed:    Rationale:      Insurance   Primary: PAID/[employer]  Insurance ID:  [id number]  [phone]      Thank You,  Samantha Garza, [facility], [place]

## 2025-03-20 NOTE — PROGRESS NOTES
Assessment & Plan     Moderate persistent asthma without complication  Patient is a very pleasant 59-year-old female who presents today for a few concerns.  With asthma, needing refill of albuterol.  Did try the Symbicort in the past without significant benefit.  Will see if we can get insurance to cover Advair.  Consider transfer to a good Rx accepting pharmacy for decreased cost in the future as well.  Will trial for the next month, follow-up next month for annual visit, and will briefly discuss asthma at that time.    - albuterol (PROAIR HFA/PROVENTIL HFA/VENTOLIN HFA) 108 (90 Base) MCG/ACT inhaler  Dispense: 18 g; Refill: 5  - fluticasone-salmeterol (ADVAIR) 250-50 MCG/ACT inhaler  Dispense: 60 each; Refill: 3  - PRIMARY CARE FOLLOW-UP SCHEDULING    Chronic laryngitis  Ongoing greater than 1 month.  Prednisone did not help significantly.  She requests a referral to ENT, and I highly support this.  Referral to ENT is placed for further evaluation of ongoing laryngitis. Consider antibiotics for sinusitis if ENT visit too far out.   - Adult ENT  Referral    Class 2 severe obesity due to excess calories with serious comorbidity and body mass index (BMI) of 35.0 to 35.9 in adult (H)  Hyperlipidemia LDL goal <100  Benign essential hypertension  For weight, she is interested in trying one of the injectable medications.  We will see if Zepbound will be covered by insurance.  We discussed importance of lifestyle changes as well.  She has difficulty with physical activity in part due to her asthma, however is very active during her workdays.  We discussed adding some weight training on other days.  Follow-up in 3 months.  Weight loss may also help gout recurrence.  - tirzepatide-Weight Management (ZEPBOUND) 7.5 MG/0.5ML prefilled pen  Dispense: 2 mL; Refill: 0  - tirzepatide-Weight Management (ZEPBOUND) 5 MG/0.5ML prefilled pen  Dispense: 2 mL; Refill: 0  - tirzepatide-Weight Management (ZEPBOUND) 2.5 MG/0.5ML  "prefilled pen  Dispense: 2 mL; Refill: 0  - PRIMARY CARE FOLLOW-UP SCHEDULING      Briefly, with gout, we did not discuss in great detail, however if she does have gout flare again, consider starting controller medication such as allopurinol.      Nicotine/Tobacco Cessation  She reports that she has been smoking cigarettes. She has a 18.5 pack-year smoking history. She has never used smokeless tobacco.  Nicotine/Tobacco Cessation Plan  Did not discuss today =      BMI  Estimated body mass index is 35.43 kg/m  as calculated from the following:    Height as of this encounter: 1.71 m (5' 7.32\").    Weight as of this encounter: 103.6 kg (228 lb 6.4 oz).     Barbara Doyle is a 59 year old, presenting for the following health issues:  URI, Weight Check, and Asthma        3/20/2025     1:31 PM   Additional Questions   Roomed by Clarisa PERDUE([facility])   Asthma Action plan - today please  Lung Cancer Screen - add order   Colon Screen - need order -   Physical with pap: schedule for 4/24/25    Referral - ENT for throat - because has been with losing voice for too long     History of Present Illness       Reason for visit:  I lost my voice its been a month and i was to talk about diet   shots She is missing 7 dose(s) of medications per week.      Weight Check  Wt Readings from Last 4 Encounters:   03/20/25 103.6 kg (228 lb 6.4 oz)   02/19/25 108 kg (238 lb)   10/22/24 107 kg (236 lb)   07/06/24 104.3 kg (230 lb)     With asthma, activity is difficult, tries to go for walks, but even walking at work is hard.     Asthma  Patient has not had an ACT done in this encounter: Link to ACT Flowsheet       3/20/2025     1:36 PM   ACT Total Scores   ACT TOTAL SCORE (Goal Greater than or Equal to 20) 16   In the past 12 months, how many times did you visit the emergency room for your asthma without being admitted to the hospital? 0   In the past 12 months, how many times were you hospitalized overnight because of your asthma? 0     Do you have " "any of the following symptoms? Cough  What makes your asthma/breathing worse?  Dust mites, Animal dander, Strong odors and fumes, Occupational exposure, Emotions, Gastric reflux, and Unknown  Do you want more information about how to use your inhaler? No  Works in a factory, saw dust, try to control it, but thinks that has a lot to do with it.    Not much different on the symbicort.   Albuterol 3-4 times a day at work.     Acute Illness  Acute illness concerns: Lost voice for a month now    Onset/Duration: 1 month  Symptoms:  Fever: No  Chills/Sweats: No  Headache (location?): No  Sinus Pressure: No  Conjunctivitis:  No  Ear Pain: no  Rhinorrhea: YES  Congestion: YES  Sore Throat: YES- when coughing - feels burning  Cough: YES  Wheeze: YES  Decreased Appetite: No  Nausea: No  Vomiting: No  Diarrhea: No  Dysuria/Freq.: No  Dysuria or Hematuria: No  Fatigue/Achiness: No  Sick/Strep Exposure: No  Therapies tried and outcome: None    Was prescribed some prednisone 2/28/25 as well as the tessalon   Didn't do much of anything.   Was sick about 3 weeks before it started. Then lost voice, no improvement at all with prednisone.     Not coughing quite as much as before. Is coughing up little chunks of phlegm, really thick. Usually a dark greenish, brown renny.     Review of Systems  Constitutional, HEENT, cardiovascular, pulmonary, gi and gu systems are negative, except as otherwise noted.      Objective    /78   Pulse 76   Temp 98.4  F (36.9  C) (Tympanic)   Resp (!) 96   Ht 1.71 m (5' 7.32\")   Wt 103.6 kg (228 lb 6.4 oz)   BMI 35.43 kg/m    Body mass index is 35.43 kg/m .  Physical Exam  Constitutional:       Appearance: Normal appearance.   HENT:      Head: Normocephalic.   Eyes:      General: No scleral icterus.     Extraocular Movements: Extraocular movements intact.      Conjunctiva/sclera: Conjunctivae normal.   Cardiovascular:      Rate and Rhythm: Normal rate.   Pulmonary:      Effort: Pulmonary effort is " normal.   Neurological:      General: No focal deficit present.      Mental Status: She is alert and oriented to person, place, and time.           Results from this visitNo results found for any visits on 03/20/25.            Signed Electronically by: Alyx Tanner MD

## 2025-03-22 DIAGNOSIS — J45.40 MODERATE PERSISTENT ASTHMA WITHOUT COMPLICATION: ICD-10-CM

## 2025-03-24 RX ORDER — FLUTICASONE PROPIONATE AND SALMETEROL 250; 50 UG/1; UG/1
1 POWDER RESPIRATORY (INHALATION) 2 TIMES DAILY
Qty: 180 EACH | Refills: 3 | Status: SHIPPED | OUTPATIENT
Start: 2025-03-24

## 2025-03-24 NOTE — TELEPHONE ENCOUNTER
PRIOR AUTHORIZATION DENIED    Medication: Zepbound    Denial Date: 3/24/2025    Denial Rational:  Per insurance, medication is excluded from patient's benefit plan and will not be covered. Patient is able to use [employer], [employer] or another discount card to help with the cost of the medication. An appeal is NOT available on excluded medications. Please follow up with the patient and close encounter.   Thank You.                   Appeal Information:  N/A

## 2025-03-30 NOTE — PROGRESS NOTES
Yanira Jaime is a 59 year old female  Chief Complaint: Chronic laryngitis, smoker, asthma. I February had a bad URTI,coughing. Then, at the end of February completely lost her voice. Now improving, but still hoarse  History of Present Illness  Location: larynx  Quality: laryngitis  Severity: moderate  Duration: Since February    Past Medical History -   Patient Active Problem List   Diagnosis    Tobacco use disorder    Primary hypertension    Mild persistent asthma without complication    Morbid obesity (H)       Current Medications -   Current Outpatient Medications:     albuterol (PROAIR HFA/PROVENTIL HFA/VENTOLIN HFA) 108 (90 Base) MCG/ACT inhaler, Inhale 1-2 puffs into the lungs every 6 hours as needed for shortness of breath, wheezing or cough., Disp: 18 g, Rfl: 5    atorvastatin (LIPITOR) 20 MG tablet, Take 1 tablet (20 mg) by mouth daily for 30 days, THEN 2 tablets (40 mg) daily for 60 days. (Patient not taking: Reported on 6/4/2024), Disp: 150 tablet, Rfl: 0    chlorthalidone (HYGROTON) 25 MG tablet, Take 1 tablet (25 mg) by mouth daily. (Patient not taking: Reported on 3/20/2025), Disp: 90 tablet, Rfl: 1    fluticasone-salmeterol (ADVAIR) 250-50 MCG/ACT inhaler, Inhale 1 puff into the lungs 2 times daily., Disp: 180 each, Rfl: 3    losartan (COZAAR) 100 MG tablet, Take 1 tablet (100 mg) by mouth daily. (Patient not taking: Reported on 3/20/2025), Disp: 90 tablet, Rfl: 1    tirzepatide-Weight Management (ZEPBOUND) 2.5 MG/0.5ML prefilled pen, Inject 0.5 mLs (2.5 mg) subcutaneously every 7 days., Disp: 2 mL, Rfl: 0    [START ON 4/17/2025] tirzepatide-Weight Management (ZEPBOUND) 5 MG/0.5ML prefilled pen, Inject 0.5 mLs (5 mg) subcutaneously every 7 days., Disp: 2 mL, Rfl: 0    [START ON 5/15/2025] tirzepatide-Weight Management (ZEPBOUND) 7.5 MG/0.5ML prefilled pen, Inject 0.5 mLs (7.5 mg) subcutaneously every 7 days., Disp: 2 mL, Rfl: 0    Allergies - No Known Allergies    Social History -   Social History      Socioeconomic History    Marital status:    Tobacco Use    Smoking status: Every Day     Current packs/day: 0.50     Average packs/day: 0.5 packs/day for 37.0 years (18.5 ttl pk-yrs)     Types: Cigarettes    Smokeless tobacco: Never   Vaping Use    Vaping status: Never Used   Substance and Sexual Activity    Alcohol use: Yes     Comment: 3-4 drinks twice a week; beer    Drug use: Not Currently    Sexual activity: Not Currently     Partners: Male     Social Drivers of Health      [facility]    Financial Resource Strain    [facility]    Social Connections   Interpersonal Safety: Low Risk  (2025)    Interpersonal Safety     Do you feel physically and emotionally safe where you currently live?: Yes     Within the past 12 months, have you been hit, slapped, kicked or otherwise physically hurt by someone?: No     Within the past 12 months, have you been humiliated or emotionally abused in other ways by your partner or ex-partner?: No       Family History -   Family History   Problem Relation Age of Onset    Peptic Ulcer Disease Mother         perioperatively  when trying to fix this.    CABG Father     Hypertension Father     Hyperlipidemia Father        Review of Systems:   !.  Weight Loss: No   2. Difficulty Breathing: No   3. Difficulty Swallowing: No   4. Pain: No    Physical Exam  B/P: Data Unavailable, T: Data Unavailable, P: Data Unavailable, R: Data Unavailable  Vitals: /84 (BP Location: Left arm, Patient Position: Chair, Cuff Size: Adult Large)   Pulse 65   Temp 97.8  F (36.6  C) (Tympanic)   Wt 103.4 kg (228 lb)   BMI 35.37 kg/m    BMI= Body mass index is 35.37 kg/m .    General  Appearance - Normal  Head/Face/Scalp:    Skin - Normal    Facial Palpation - Normal    Facial Strength -  Normal  Ears:    Pinna - Normal    Canal - Normal   Tympanic membrane - Normal  Nose:    External - Normal    Septum - Normal    Turbinates - Normal    Middle meatus - Normal  Oral Cavity:    Lips - Normal    Floor of Mouth - Normal    Gingiva - Normal    Tongue - Normal    Buccal - Normal    Palate - Normal  Nasopharynx:    Oropharynx:    Tonsils - Normal    Tongue base - Normal    Soft palate - Normal    Posterior pharyngeal wall - Normal  Hypopharynx: clear  Larynx:    Epiglottis - normal    Aryepiglottic folds - red    Arytenoids - red and edematous, post-cricoid and interarytenoid edema and erythema    False vocal cords - red    True vocal cords - edematous and erythematous  Neck Masses - No  Neck lymphatics - no lymphadenopathy  Thyroid - Normal  Salivary glands - Normal    Audiogram - not applicable  Radiology - not applicable   Reports:   View films:  Procedures   Procedure: Flexible Endoscopy  Indication: Hoarseness     Fiberoptic examination was performed using flexible laryngoscope.  Both sides of the nose were sprayed with a mixture of lidocaine and neosynephrine.  The flexible nasopharyngoscope scope was passed through the nasal cavity.  The nasal cavity was unremarkable.  The nasopharynx was mucosally covered and symmetric.  The Eustachian tube openings were unobstructed.  Going further down I had a clear view of the base of tongue which had normal appearing lingual tonsillar tissue.  The base of tongue was free of lesions, and the vallecula was open.  The epiglottis was smooth and mucosally covered.  The supraglottic larynx was then clearly visualized.  The vocal cords moved smoothly and symmetrically, they were pearly white and no lesions were seen.  The pyriform sinuses were open, and the limited view of the postcricoid region dLarynx:    Epiglottis - normal    Aryepiglottic folds - red    Arytenoids - red and edematous, post-cricoid and interarytenoid edema and erythema    False vocal cords - red     True vocal cords - edematous and erythematous  The patient tolerated the patient well.    Patient Education:     A/P - Yanira Jaime is a 59 year old female  Medical Decision Making 1. Hoarseness 2. Laryngitis 3.  Laryngopharyngeal reflux 4. Smoker - cessation discussed  Omeprazole 40 mg qpm, Amoxicillin x 1 week  Follow up in 4 weeks

## 2025-04-02 ENCOUNTER — TELEPHONE (OUTPATIENT)
Dept: FAMILY MEDICINE | Facility: CLINIC | Age: 60
End: 2025-04-02
Payer: COMMERCIAL

## 2025-04-02 NOTE — TELEPHONE ENCOUNTER
Prior Authorization Retail Medication Request    Medication/Dose: Zepbound    Diagnosis and ICD code (if different than what is on RX):    New/renewal/insurance change PA/secondary ins. PA:  Previously Tried and Failed:    Rationale:      Insurance   Primary: PAID/[employer]  Insurance ID:  [id number]  [phone]    Thank You,  Samantha Garza, [facility], [place]

## 2025-04-03 ENCOUNTER — OFFICE VISIT (OUTPATIENT)
Dept: OTOLARYNGOLOGY | Facility: CLINIC | Age: 60
End: 2025-04-03
Attending: STUDENT IN AN ORGANIZED HEALTH CARE EDUCATION/TRAINING PROGRAM
Payer: COMMERCIAL

## 2025-04-03 VITALS
DIASTOLIC BLOOD PRESSURE: 84 MMHG | WEIGHT: 228 LBS | HEART RATE: 65 BPM | SYSTOLIC BLOOD PRESSURE: 139 MMHG | TEMPERATURE: 97.8 F | BODY MASS INDEX: 35.37 KG/M2

## 2025-04-03 DIAGNOSIS — R49.0 HOARSENESS: Primary | ICD-10-CM

## 2025-04-03 DIAGNOSIS — K21.9 LARYNGOPHARYNGEAL REFLUX: ICD-10-CM

## 2025-04-03 DIAGNOSIS — Z72.0 TOBACCO ABUSE: ICD-10-CM

## 2025-04-03 DIAGNOSIS — J04.0 LARYNGITIS: ICD-10-CM

## 2025-04-03 RX ORDER — OMEPRAZOLE 40 MG/1
40 CAPSULE, DELAYED RELEASE ORAL DAILY
Qty: 90 CAPSULE | Refills: 0 | Status: SHIPPED | OUTPATIENT
Start: 2025-04-03 | End: 2025-07-02

## 2025-04-03 RX ORDER — AMOXICILLIN 500 MG/1
500 CAPSULE ORAL 2 TIMES DAILY
Qty: 14 CAPSULE | Refills: 0 | Status: SHIPPED | OUTPATIENT
Start: 2025-04-03 | End: 2025-04-10

## 2025-04-03 ASSESSMENT — PAIN SCALES - GENERAL: PAINLEVEL_OUTOF10: NO PAIN (0)

## 2025-04-03 NOTE — LETTER
4/3/2025      Yanira Jaime  [address] [address]      Dear Colleague,    Thank you for referring your patient, Yanira Jaime, to the [facility]. Please see a copy of my visit note below.    Yanira Jaime is a 59 year old female  Chief Complaint: Chronic laryngitis, smoker, asthma. I February had a bad URTI,coughing. Then, at the end of February completely lost her voice. Now improving, but still hoarse  History of Present Illness  Location: larynx  Quality: laryngitis  Severity: moderate  Duration: Since February    Past Medical History -   Patient Active Problem List   Diagnosis     Tobacco use disorder     Primary hypertension     Mild persistent asthma without complication     Morbid obesity (H)       Current Medications -   Current Outpatient Medications:      albuterol (PROAIR HFA/PROVENTIL HFA/VENTOLIN HFA) 108 (90 Base) MCG/ACT inhaler, Inhale 1-2 puffs into the lungs every 6 hours as needed for shortness of breath, wheezing or cough., Disp: 18 g, Rfl: 5     atorvastatin (LIPITOR) 20 MG tablet, Take 1 tablet (20 mg) by mouth daily for 30 days, THEN 2 tablets (40 mg) daily for 60 days. (Patient not taking: Reported on 6/4/2024), Disp: 150 tablet, Rfl: 0     chlorthalidone (HYGROTON) 25 MG tablet, Take 1 tablet (25 mg) by mouth daily. (Patient not taking: Reported on 3/20/2025), Disp: 90 tablet, Rfl: 1     fluticasone-salmeterol (ADVAIR) 250-50 MCG/ACT inhaler, Inhale 1 puff into the lungs 2 times daily., Disp: 180 each, Rfl: 3     losartan (COZAAR) 100 MG tablet, Take 1 tablet (100 mg) by mouth daily. (Patient not taking: Reported on 3/20/2025), Disp: 90 tablet, Rfl: 1     tirzepatide-Weight Management (ZEPBOUND) 2.5 MG/0.5ML prefilled pen, Inject 0.5 mLs (2.5 mg) subcutaneously every 7 days., Disp: 2 mL, Rfl: 0     [START ON 4/17/2025] tirzepatide-Weight Management (ZEPBOUND) 5 MG/0.5ML prefilled pen, Inject 0.5 mLs (5 mg) subcutaneously every 7 days., Disp: 2  mL, Rfl: 0     [START ON 5/15/2025] tirzepatide-Weight Management (ZEPBOUND) 7.5 MG/0.5ML prefilled pen, Inject 0.5 mLs (7.5 mg) subcutaneously every 7 days., Disp: 2 mL, Rfl: 0    Allergies - No Known Allergies    Social History -   Social History     Socioeconomic History     Marital status:    Tobacco Use     Smoking status: Every Day     Current packs/day: 0.50     Average packs/day: 0.5 packs/day for 37.0 years (18.5 ttl pk-yrs)     Types: Cigarettes     Smokeless tobacco: Never   Vaping Use     Vaping status: Never Used   Substance and Sexual Activity     Alcohol use: Yes     Comment: 3-4 drinks twice a week; beer     Drug use: Not Currently     Sexual activity: Not Currently     Partners: Male     Social Drivers of Health      Received from [facility]    Financial Resource Strain    [facility]    Social Connections   Interpersonal Safety: Low Risk  (2025)    Interpersonal Safety      Do you feel physically and emotionally safe where you currently live?: Yes      Within the past 12 months, have you been hit, slapped, kicked or otherwise physically hurt by someone?: No      Within the past 12 months, have you been humiliated or emotionally abused in other ways by your partner or ex-partner?: No       Family History -   Family History   Problem Relation Age of Onset     Peptic Ulcer Disease Mother         perioperatively  when trying to fix this.     CABG Father      Hypertension Father      Hyperlipidemia Father        Review of Systems:   !.  Weight Loss: No   2. Difficulty Breathing: No   3. Difficulty Swallowing: No   4. Pain: No    Physical Exam  B/P: Data Unavailable, T: Data Unavailable, P: Data Unavailable, R: Data Unavailable  Vitals: /84 (BP Location: Left arm, Patient Position: Chair, Cuff Size: Adult Large)    Pulse 65   Temp 97.8  F (36.6  C) (Tympanic)   Wt 103.4 kg (228 lb)   BMI 35.37 kg/m    BMI= Body mass index is 35.37 kg/m .    General  Appearance - Normal  Head/Face/Scalp:    Skin - Normal    Facial Palpation - Normal    Facial Strength - Normal  Ears:    Pinna - Normal    Canal - Normal   Tympanic membrane - Normal  Nose:    External - Normal    Septum - Normal    Turbinates - Normal    Middle meatus - Normal  Oral Cavity:    Lips - Normal    Floor of Mouth - Normal    Gingiva - Normal    Tongue - Normal    Buccal - Normal    Palate - Normal  Nasopharynx:    Oropharynx:    Tonsils - Normal    Tongue base - Normal    Soft palate - Normal    Posterior pharyngeal wall - Normal  Hypopharynx: clear  Larynx:    Epiglottis - normal    Aryepiglottic folds - red    Arytenoids - red and edematous, post-cricoid and interarytenoid edema and erythema    False vocal cords - red    True vocal cords - edematous and erythematous  Neck Masses - No  Neck lymphatics - no lymphadenopathy  Thyroid - Normal  Salivary glands - Normal    Audiogram - not applicable  Radiology - not applicable   Reports:   View films:  Procedures   Procedure: Flexible Endoscopy  Indication: Hoarseness     Fiberoptic examination was performed using flexible laryngoscope.  Both sides of the nose were sprayed with a mixture of lidocaine and neosynephrine.  The flexible nasopharyngoscope scope was passed through the nasal cavity.  The nasal cavity was unremarkable.  The nasopharynx was mucosally covered and symmetric.  The Eustachian tube openings were unobstructed.  Going further down I had a clear view of the base of tongue which had normal appearing lingual tonsillar tissue.  The base of tongue was free of lesions, and the vallecula was open.  The epiglottis was smooth and mucosally covered.  The supraglottic larynx was then clearly visualized.  The vocal cords moved smoothly and symmetrically, they were pearly white and no lesions were seen.  The  pyriform sinuses were open, and the limited view of the postcricoid region dLarynx:    Epiglottis - normal    Aryepiglottic folds - red    Arytenoids - red and edematous, post-cricoid and interarytenoid edema and erythema    False vocal cords - red    True vocal cords - edematous and erythematous  The patient tolerated the patient well.    Patient Education:     A/P - Yanira Jaime is a 59 year old female  Medical Decision Making 1. Hoarseness 2. Laryngitis 3.  Laryngopharyngeal reflux 4. Smoker - cessation discussed  Omeprazole 40 mg qpm, Amoxicillin x 1 week  Follow up in 4 weeks       Again, thank you for allowing me to participate in the care of your patient.        Sincerely,        Wilman Vogt MD    Electronically signed

## 2025-04-03 NOTE — TELEPHONE ENCOUNTER
Central Prior Authorization Team   Phone: [phone]    PA Initiation    Medication: Zepbound    Insurance Company: [employer] - Phone [phone] Fax [phone]  Pharmacy Filling the Rx: [place], [place] - [address] [place] [address]  Filling Pharmacy Phone: [phone]  Filling Pharmacy Fax:    Start Date: 4/3/2025    [facility] KEY: [id number]

## 2025-04-03 NOTE — NURSING NOTE
"Initial /84 (BP Location: Left arm, Patient Position: Chair, Cuff Size: Adult Large)   Pulse 65   Temp 97.8  F (36.6  C) (Tympanic)   Wt 103.4 kg (228 lb)   BMI 35.37 kg/m   Estimated body mass index is 35.37 kg/m  as calculated from the following:    Height as of 3/20/25: 1.71 m (5' 7.32\").    Weight as of this encounter: 103.4 kg (228 lb). .  Radhika Perrin LPN    "

## 2025-04-08 NOTE — TELEPHONE ENCOUNTER
Prior Authorization Approval    Authorization Effective Date: 3/4/2025  Authorization Expiration Date: 12/4/2025  Medication: Zepbound    Approved Dose/Quantity:    Reference #:     Insurance Company: [employer] - Phone [phone] Fax [phone]  Expected CoPay:       CoPay Card Available:      Foundation Assistance Needed:    Which Pharmacy is filling the prescription (Not needed for infusion/clinic administered): [facility] [place], [place] - [address] [place] [address]  Pharmacy Notified:  Yes  Patient Notified:  **Instructed pharmacy to notify patient when script is ready to /ship.**

## 2025-04-23 SDOH — HEALTH STABILITY: PHYSICAL HEALTH: ON AVERAGE, HOW MANY MINUTES DO YOU ENGAGE IN EXERCISE AT THIS LEVEL?: 20 MIN

## 2025-04-23 SDOH — HEALTH STABILITY: PHYSICAL HEALTH: ON AVERAGE, HOW MANY DAYS PER WEEK DO YOU ENGAGE IN MODERATE TO STRENUOUS EXERCISE (LIKE A BRISK WALK)?: 3 DAYS

## 2025-04-23 ASSESSMENT — ASTHMA QUESTIONNAIRES
QUESTION_5 LAST FOUR WEEKS HOW WOULD YOU RATE YOUR ASTHMA CONTROL: WELL CONTROLLED
QUESTION_4 LAST FOUR WEEKS HOW OFTEN HAVE YOU USED YOUR RESCUE INHALER OR NEBULIZER MEDICATION (SUCH AS ALBUTEROL): ONCE A WEEK OR LESS
QUESTION_2 LAST FOUR WEEKS HOW OFTEN HAVE YOU HAD SHORTNESS OF BREATH: NOT AT ALL
ACT_TOTALSCORE: 22
QUESTION_3 LAST FOUR WEEKS HOW OFTEN DID YOUR ASTHMA SYMPTOMS (WHEEZING, COUGHING, SHORTNESS OF BREATH, CHEST TIGHTNESS OR PAIN) WAKE YOU UP AT NIGHT OR EARLIER THAN USUAL IN THE MORNING: NOT AT ALL
QUESTION_1 LAST FOUR WEEKS HOW MUCH OF THE TIME DID YOUR ASTHMA KEEP YOU FROM GETTING AS MUCH DONE AT WORK, SCHOOL OR AT HOME: A LITTLE OF THE TIME

## 2025-04-23 ASSESSMENT — SOCIAL DETERMINANTS OF HEALTH (SDOH): HOW OFTEN DO YOU GET TOGETHER WITH FRIENDS OR RELATIVES?: PATIENT DECLINED

## 2025-04-24 ENCOUNTER — ORDERS ONLY (AUTO-RELEASED) (OUTPATIENT)
Dept: FAMILY MEDICINE | Facility: CLINIC | Age: 60
End: 2025-04-24

## 2025-04-24 ENCOUNTER — OFFICE VISIT (OUTPATIENT)
Dept: FAMILY MEDICINE | Facility: CLINIC | Age: 60
End: 2025-04-24
Payer: COMMERCIAL

## 2025-04-24 VITALS
RESPIRATION RATE: 20 BRPM | TEMPERATURE: 98.1 F | SYSTOLIC BLOOD PRESSURE: 138 MMHG | HEART RATE: 68 BPM | OXYGEN SATURATION: 97 % | HEIGHT: 67 IN | WEIGHT: 223 LBS | BODY MASS INDEX: 35 KG/M2 | DIASTOLIC BLOOD PRESSURE: 84 MMHG

## 2025-04-24 DIAGNOSIS — Z12.11 SCREEN FOR COLON CANCER: ICD-10-CM

## 2025-04-24 DIAGNOSIS — Z11.4 SCREENING FOR HIV (HUMAN IMMUNODEFICIENCY VIRUS): ICD-10-CM

## 2025-04-24 DIAGNOSIS — E78.5 HYPERLIPIDEMIA LDL GOAL <100: ICD-10-CM

## 2025-04-24 DIAGNOSIS — Z11.59 NEED FOR HEPATITIS C SCREENING TEST: ICD-10-CM

## 2025-04-24 DIAGNOSIS — R73.03 PREDIABETES: ICD-10-CM

## 2025-04-24 DIAGNOSIS — Z00.00 ROUTINE GENERAL MEDICAL EXAMINATION AT A HEALTH CARE FACILITY: Primary | ICD-10-CM

## 2025-04-24 DIAGNOSIS — Z87.891 PERSONAL HISTORY OF TOBACCO USE: ICD-10-CM

## 2025-04-24 DIAGNOSIS — I10 PRIMARY HYPERTENSION: ICD-10-CM

## 2025-04-24 DIAGNOSIS — Z12.4 CERVICAL CANCER SCREENING: ICD-10-CM

## 2025-04-24 LAB
ANION GAP SERPL CALCULATED.3IONS-SCNC: 9 MMOL/L (ref 7–15)
BUN SERPL-MCNC: 17.2 MG/DL (ref 6–20)
CALCIUM SERPL-MCNC: 9.8 MG/DL (ref 8.8–10.4)
CHLORIDE SERPL-SCNC: 100 MMOL/L (ref 98–107)
CHOLEST SERPL-MCNC: 210 MG/DL
CREAT SERPL-MCNC: 0.95 MG/DL (ref 0.51–0.95)
EGFRCR SERPLBLD CKD-EPI 2021: 69 ML/MIN/1.73M2
EST. AVERAGE GLUCOSE BLD GHB EST-MCNC: 126 MG/DL
FASTING STATUS PATIENT QL REPORTED: NO
FASTING STATUS PATIENT QL REPORTED: NO
GLUCOSE SERPL-MCNC: 95 MG/DL (ref 70–99)
HBA1C MFR BLD: 6 % (ref 0–5.6)
HCO3 SERPL-SCNC: 27 MMOL/L (ref 22–29)
HDLC SERPL-MCNC: 49 MG/DL
LDLC SERPL CALC-MCNC: 137 MG/DL
NONHDLC SERPL-MCNC: 161 MG/DL
POTASSIUM SERPL-SCNC: 4.7 MMOL/L (ref 3.4–5.3)
SODIUM SERPL-SCNC: 136 MMOL/L (ref 135–145)
TRIGL SERPL-MCNC: 119 MG/DL

## 2025-04-24 ASSESSMENT — PAIN SCALES - GENERAL: PAINLEVEL_OUTOF10: NO PAIN (0)

## 2025-04-24 NOTE — PROGRESS NOTES
"Preventive Care Visit  [facility]  Alyx Tanner MD, Family Medicine  Apr 24, 2025      Assessment & Plan     Routine general medical examination at a health care facility  Patient is a pleasant 59 year old who presents today for annual visit. She declines pap today. Declines vaccinations.     Primary hypertension  BP has been under control over the past few visits without medication. Will do BMP today.   - BASIC METABOLIC PANEL  - BASIC METABOLIC PANEL    Prediabetes  History of elevated A1c last year, due for recheck.   - Hemoglobin A1c  - Hemoglobin A1c    Screening for HIV (human immunodeficiency virus)  Once in a lifetime screen.   - HIV Antigen Antibody Combo  - HIV Antigen Antibody Combo    Need for hepatitis C screening test  Once in a lifetime screen.   - Hepatitis C Screen Reflex to HCV RNA Quant and Genotype  - Hepatitis C Screen Reflex to HCV RNA Quant and Genotype    Personal history of tobacco use  Qualifies for lung cancer screening. Has tried to quit in the past. She is open to baseline ct chest cancer screen.   - Prof fee: Shared Decision Making for Lung Cancer Screening  - CT Chest Lung Cancer Scrn Low Dose wo    Screen for colon cancer  Opts for cologuard. Discussed that colonoscopy is gold standard.   - COLOGUARD([employer])    Cervical cancer screening  Declines today, will return for testing. Plan cotest with HPV given possible expsoure to HPV with exhusband.     Hyperlipidemia LDL goal <100  Ordered previously, due for recheck, obtained today.   - Lipid panel reflex to direct LDL Fasting      Patient has been advised of split billing requirements and indicates understanding: Yes        BMI  Estimated body mass index is 34.6 kg/m  as calculated from the following:    Height as of this encounter: 1.71 m (5' 7.32\").    Weight as of this encounter: 101.2 kg (223 lb).     Counseling  Appropriate preventive services were addressed with this patient via screening, " questionnaire, or discussion as appropriate for fall prevention, nutrition, physical activity, Tobacco-use cessation, social engagement, weight loss and cognition.  Checklist reviewing preventive services available has been given to the patient.  Reviewed patient's diet, addressing concerns and/or questions.   She is at risk for lack of exercise and has been provided with information to increase physical activity for the benefit of her well-being.       Barbara Doyle is a 59 year old, presenting for the following:  Physical        4/24/2025    11:04 AM   Additional Questions   Roomed by Clarice WEIR   Accompanied by Self      History of Present Illness       Reason for visit:  Physical   She is taking medications regularly.       Hpv? Expilyband just recently diagnosed with HPV positive neck cancer.   She had neg HPV in 2017    Inhaler working great!    Started zepbound.   According to home scale, 219 at home.   No side effects    Advance Care Planning    Discussed advance care planning with patient; informed AVS has link to [facility].        4/23/2025   General Health   How would you rate your overall physical health? Good   Feel stress (tense, anxious, or unable to sleep) Patient declined         4/23/2025   Nutrition   Three or more servings of calcium each day? Yes   Diet: Regular (no restrictions)   How many servings of fruit and vegetables per day? (!) 2-3   How many sweetened beverages each day? (!) 2         4/23/2025   Exercise   Days per week of moderate/strenous exercise 3 days   Average minutes spent exercising at this level 20 min         4/23/2025   Social Factors   Frequency of gathering with friends or relatives Patient declined   Worry food won't last until get money to buy more No   Food not last or not have enough money for food? No   Do you have housing? (Housing is defined as stable permanent housing and does not include staying outside in a car, in a tent, in an abandoned building, in  an overnight shelter, or couch-surfing.) Yes   Are you worried about losing your housing? No   Lack of transportation? No   Unable to get utilities (heat,electricity)? No         4/23/2025   Fall Risk   Fallen 2 or more times in the past year? No   Trouble with walking or balance? No          4/23/2025   Dental   Dentist two times every year? (!) DECLINE           Today's PHQ-2 Score:       2/19/2025     1:34 PM   PHQ-2 ( 1999 [employer])   Q1: Little interest or pleasure in doing things 0   Q2: Feeling down, depressed or hopeless 0   PHQ-2 Score 0    Q1: Little interest or pleasure in doing things Not at all   Q2: Feeling down, depressed or hopeless Not at all   PHQ-2 Score 0       Patient-reported         4/23/2025   Substance Use   Alcohol more than 3/day or more than 7/wk Not Applicable   Do you use any other substances recreationally? No    (!) DECLINE       Multiple values from one day are sorted in reverse-chronological order     Social History     Tobacco Use    Smoking status: Every Day     Current packs/day: 0.50     Average packs/day: 0.5 packs/day for 43.3 years (21.7 ttl pk-yrs)     Types: Cigarettes     Start date: 1982    Smokeless tobacco: Never   Vaping Use    Vaping status: Never Used   Substance Use Topics    Alcohol use: Yes     Comment: 3-4 drinks twice a week; beer    Drug use: Not Currently           11/6/2024   LAST FHS-7 RESULTS   1st degree relative breast or ovarian cancer No   Any relative bilateral breast cancer No   Any male have breast cancer No   Any ONE woman have BOTH breast AND ovarian cancer No   Any woman with breast cancer before 50yrs No   2 or more relatives with breast AND/OR ovarian cancer No   2 or more relatives with breast AND/OR bowel cancer No        Mammogram Screening - Mammogram every 1-2 years updated in Health Maintenance based on mutual decision making          4/23/2025   One time HIV Screening   Previous HIV test? No         4/23/2025   STI Screening   New sexual  "partner(s) since last STI/HIV test? No     History of abnormal Pap smear: No - age 30- 64 PAP with HPV every 5 years recommended       ASCVD Risk   The 10-year ASCVD risk score (Rose MCKINNON, et al., 2019) is: 9.2%    Values used to calculate the score:      Age: 59 years      Sex: Female      Is Non- : No      Diabetic: No      Tobacco smoker: Yes      Systolic Blood Pressure: 138 mmHg      Is BP treated: No      HDL Cholesterol: 37 mg/dL      Total Cholesterol: 197 mg/dL         Reviewed and updated as needed this visit by Provider                        Review of Systems  Constitutional, HEENT, cardiovascular, pulmonary, gi and gu systems are negative, except as otherwise noted.     Objective    Exam  /84 (BP Location: Right arm, Patient Position: Sitting, Cuff Size: Adult Regular)   Pulse 68   Temp 98.1  F (36.7  C) (Tympanic)   Resp 20   Ht 1.71 m (5' 7.32\")   Wt 101.2 kg (223 lb)   SpO2 97%   BMI 34.60 kg/m     Estimated body mass index is 34.6 kg/m  as calculated from the following:    Height as of this encounter: 1.71 m (5' 7.32\").    Weight as of this encounter: 101.2 kg (223 lb).    Physical Exam  GENERAL: alert and no distress  EYES: Eyes grossly normal to inspection, and conjunctivae and sclerae normal  HENT: ear canals and TM's normal, nose and mouth without ulcers or lesions  NECK: no adenopathy, no asymmetry, masses, or scars  RESP: lungs clear to auscultation - no rales, rhonchi or wheezes  CV: regular rate and rhythm, normal S1 S2, no S3 or S4, no murmur, click or rub, no peripheral edema  ABDOMEN: soft, nontender, no hepatosplenomegaly, no masses and bowel sounds normal  MS: no gross musculoskeletal defects noted, no edema  SKIN: no suspicious lesions or rashes  NEURO: Normal strength and tone, mentation intact and speech normal  PSYCH: mentation appears normal, affect normal/bright        Signed Electronically by: Alyx Tanner MD  Lung Cancer " Screening Shared Decision Making Visit     Yanira Jaime, a 59 year old female, is eligible for lung cancer screening    History   Smoking Status    Every Day    Types: Cigarettes   Smokeless Tobacco    Never       I have discussed with patient the risks and benefits of screening for lung cancer with low-dose CT.     The risks include:    radiation exposure: one low dose chest CT has as much ionizing radiation as about 15 chest x-rays, or 6 months of background radiation living in [place]      false positives: most findings/nodules are NOT cancer, but some might still require additional diagnostic evaluation, including biopsy    over-diagnosis: some slow growing cancers that might never have been clinically significant will be detected and treated unnecessarily     The benefit of early detection of lung cancer is contingent upon adherence to annual screening or more frequent follow up if indicated.     Furthermore, to benefit from screening, Yanira must be willing and able to undergo diagnostic procedures, if indicated. Although no specific guide is available for determining severity of comorbidities, it is reasonable to withhold screening in patients who have greater mortality risk from other diseases.     We did discuss that the best way to prevent lung cancer is to not smoke.    Some patients may value a numeric estimation of lung cancer risk when evaluating if lung cancer screening is right for them, here is one calculator:    ShouldIScreen

## 2025-04-24 NOTE — PATIENT INSTRUCTIONS
Patient Education   Preventive Care Advice   This is general advice given by our system to help you stay healthy. However, your care team may have specific advice just for you. Please talk to your care team about your preventive care needs.  Nutrition  Eat 5 or more servings of fruits and vegetables each day.  Try wheat bread, brown rice and whole grain pasta (instead of white bread, rice, and pasta).  Get enough calcium and vitamin D. Check the label on foods and aim for 100% of the RDA (recommended daily allowance).  Lifestyle  Exercise at least 150 minutes each week  (30 minutes a day, 5 days a week).  Do muscle strengthening activities 2 days a week. These help control your weight and prevent disease.  No smoking.  Wear sunscreen to prevent skin cancer.  Have a dental exam and cleaning every 6 months.  Yearly exams  See your health care team every year to talk about:  Any changes in your health.  Any medicines your care team has prescribed.  Preventive care, family planning, and ways to prevent chronic diseases.  Shots (vaccines)   HPV shots (up to age 26), if you've never had them before.  Hepatitis B shots (up to age 59), if you've never had them before.  COVID-19 shot: Get this shot when it's due.  Flu shot: Get a flu shot every year.  Tetanus shot: Get a tetanus shot every 10 years.  Pneumococcal, hepatitis A, and RSV shots: Ask your care team if you need these based on your risk.  Shingles shot (for age 50 and up)  General health tests  Diabetes screening:  Starting at age 35, Get screened for diabetes at least every 3 years.  If you are younger than age 35, ask your care team if you should be screened for diabetes.  Cholesterol test: At age 39, start having a cholesterol test every 5 years, or more often if advised.  Bone density scan (DEXA): At age 50, ask your care team if you should have this scan for osteoporosis (brittle bones).  Hepatitis C: Get tested at least once in your life.  STIs (sexually  transmitted infections)  Before age 24: Ask your care team if you should be screened for STIs.  After age 24: Get screened for STIs if you're at risk. You are at risk for STIs (including HIV) if:  You are sexually active with more than one person.  You don't use condoms every time.  You or a partner was diagnosed with a sexually transmitted infection.  If you are at risk for HIV, ask about PrEP medicine to prevent HIV.  Get tested for HIV at least once in your life, whether you are at risk for HIV or not.  Cancer screening tests  Cervical cancer screening: If you have a cervix, begin getting regular cervical cancer screening tests starting at age 21.  Breast cancer scan (mammogram): If you've ever had breasts, begin having regular mammograms starting at age 40. This is a scan to check for breast cancer.  Colon cancer screening: It is important to start screening for colon cancer at age 45.  Have a colonoscopy test every 10 years (or more often if you're at risk) Or, ask your provider about stool tests like a FIT test every year or Cologuard test every 3 years.  To learn more about your testing options, visit:   .  For help making a decision, visit:   https://bit.ly/fi89434.  Prostate cancer screening test: If you have a prostate, ask your care team if a prostate cancer screening test (PSA) at age 55 is right for you.  Lung cancer screening: If you are a current or former smoker ages 50 to 80, ask your care team if ongoing lung cancer screenings are right for you.  For informational purposes only. Not to replace the advice of your health care provider. Copyright   2023 [facility]. All rights reserved. Clinically reviewed by the [facility]. [employer] 370484 - REV 01/24.     Lung Cancer Screening   Frequently Asked Questions  If you are at high-risk for lung cancer, getting screened with low-dose computed tomography (LDCT) every year can help save your life. This handout offers  answers to some of the most common questions about lung cancer screening. If you have other questions, please call [phone] ([phone]).     What is it?  Lung cancer screening uses special X-ray technology to create an image of your lung tissue. The exam is quick and easy and takes less than 10 seconds. We don t give you any medicine or use any needles. You can eat before and after the exam. You don t need to change your clothes as long as the clothing on your chest doesn t contain metal. But, you do need to be able to hold your breath for at least 6 seconds during the exam.    What is the goal of lung cancer screening?  The goal of lung cancer screening is to save lives. Many times, lung cancer is not found until a person starts having physical symptoms. Lung cancer screening can help detect lung cancer in the earliest stages when it may be easier to treat.    Who should be screened for lung cancer?  We suggest lung cancer screening for anyone who is at high-risk for lung cancer. You are in the high-risk group if you:      are between the ages of 55 and 79, and    have smoked at least 1 pack of cigarettes a day for 20 or more years, and    still smoke or have quit within the past 15 years.    However, if you have a new cough or shortness of breath, you should talk to your doctor before being screened.    Why does it matter if I have symptoms?  Certain symptoms can be a sign that you have a condition in your lungs that should be checked and treated by your doctor. These symptoms include fever, chest pain, a new or changing cough, shortness of breath that you have never felt before, coughing up blood or unexplained weight loss. Having any of these symptoms can greatly affect the results of lung cancer screening.       Should all smokers get an LDCT lung cancer screening exam?  It depends. Lung cancer screening is for a very specific group of men and women who have a history of heavy smoking over a long  period of time (see  Who should be screened for lung cancer  above).  I am in the high-risk group, but have been diagnosed with cancer in the past. Is LDCT lung cancer screening right for me?  In some cases, you should not have LDCT lung screening, such as when your doctor is already following your cancer with CT scan studies. Your doctor will help you decide if LDCT lung screening is right for you.  Do I need to have a screening exam every year?  Yes. If you are in the high-risk group described earlier, you should get an LDCT lung cancer screening exam every year until you are 79, or are no longer willing or able to undergo screening and possible procedures to diagnose and treat lung cancer.  How effective is LDCT at preventing death from lung cancer?  Studies have shown that LDCT lung cancer screening can lower the risk of death from lung cancer by 20 percent in people who are at high-risk.  What are the risks?  There are some risks and limitations of LDCT lung cancer screening. We want to make sure you understand the risks and benefits, so please let us know if you have any questions. Your doctor may want to talk with you more about these risks.    Radiation exposure: As with any exam that uses radiation, there is a very small increased risk of cancer. The amount of radiation in LDCT is small--about the same amount a person would get from a mammogram. Your doctor orders the exam when he or she feels the potential benefits outweigh the risks.    False negatives: No test is perfect, including LDCT. It is possible that you may have a medical condition, including lung cancer, that is not found during your exam. This is called a false negative result.    False positives and more testing: LDCT very often finds something in the lung that could be cancer, but in fact is not. This is called a false positive result. False positive tests often cause anxiety. To make sure these findings are not cancer, you may need to have  more tests. These tests will be done only if you give us permission. Sometimes patients need a treatment that can have side effects, such as a biopsy. For more information on false positives, see  What can I expect from the results?     Findings not related to lung cancer: Your LDCT exam also takes pictures of areas of your body next to your lungs. In a very small number of cases, the CT scan will show an abnormal finding in one of these areas, such as your kidneys, adrenal glands, liver or thyroid. This finding may not be serious, but you may need more tests. Your doctor can help you decide what other tests you may need, if any.  What can I expect from the results?  About 1 out of 4 LDCT exams will find something that may need more tests. Most of the time, these findings are lung nodules. Lung nodules are very small collections of tissue in the lung. These nodules are very common, and the vast majority--more than 97 percent--are not cancer (benign). Most are normal lymph nodes or small areas of scarring from past infections.  But, if a small lung nodule is found to be cancer, the cancer can be cured more than 90 percent of the time. To know if the nodule is cancer, we may need to get more images before your next yearly screening exam. If the nodule has suspicious features (for example, it is large, has an odd shape or grows over time), we will refer you to a specialist for further testing.  Will my doctor also get the results?  Yes. Your doctor will get a copy of your results.  Is it okay to keep smoking now that there s a cancer screening exam?  No. Tobacco is one of the strongest cancer-causing agents. It causes not only lung cancer, but other cancers and cardiovascular (heart) diseases as well. The damage caused by smoking builds over time. This means that the longer you smoke, the higher your risk of disease. While it is never too late to quit, the sooner you quit, the better.  Where can I find help to quit  smoking?  The best way to prevent lung cancer is to stop smoking. If you have already quit smoking, congratulations and keep it up! For help on quitting smoking, please call [facility] at 6-267-QUITNOW (1-485.805.1293) or the [employer] at 1-169.546.1756 to find local resources near you.  One-on-one health coaching:  If you d prefer to work individually with a health care provider on tobacco cessation, we offer:      Medication Therapy Management:  Our specially trained pharmacists work closely with you and your doctor to help you quit smoking.  Call [phone] or [phone] (toll free).

## 2025-05-28 ENCOUNTER — MYC MEDICAL ADVICE (OUTPATIENT)
Dept: FAMILY MEDICINE | Facility: CLINIC | Age: 60
End: 2025-05-28
Payer: COMMERCIAL

## 2025-05-28 DIAGNOSIS — M10.9 ACUTE GOUT INVOLVING TOE OF LEFT FOOT, UNSPECIFIED CAUSE: Primary | ICD-10-CM

## 2025-05-29 RX ORDER — PREDNISONE 20 MG/1
TABLET ORAL
Qty: 20 TABLET | Refills: 0 | Status: SHIPPED | OUTPATIENT
Start: 2025-05-29

## 2025-06-09 ENCOUNTER — PATIENT OUTREACH (OUTPATIENT)
Dept: CARE COORDINATION | Facility: CLINIC | Age: 60
End: 2025-06-09
Payer: COMMERCIAL

## 2025-07-01 ENCOUNTER — MYC MEDICAL ADVICE (OUTPATIENT)
Dept: FAMILY MEDICINE | Facility: CLINIC | Age: 60
End: 2025-07-01
Payer: COMMERCIAL

## 2025-07-01 DIAGNOSIS — E66.812 CLASS 2 SEVERE OBESITY DUE TO EXCESS CALORIES WITH SERIOUS COMORBIDITY AND BODY MASS INDEX (BMI) OF 35.0 TO 35.9 IN ADULT (H): ICD-10-CM

## 2025-07-01 DIAGNOSIS — E78.5 HYPERLIPIDEMIA LDL GOAL <100: ICD-10-CM

## 2025-07-01 DIAGNOSIS — I10 BENIGN ESSENTIAL HYPERTENSION: ICD-10-CM

## 2025-07-01 DIAGNOSIS — E66.01 CLASS 2 SEVERE OBESITY DUE TO EXCESS CALORIES WITH SERIOUS COMORBIDITY AND BODY MASS INDEX (BMI) OF 35.0 TO 35.9 IN ADULT (H): ICD-10-CM

## 2025-07-01 RX ORDER — TIRZEPATIDE 7.5 MG/.5ML
INJECTION, SOLUTION SUBCUTANEOUS
Qty: 6 ML | Refills: 1 | Status: SHIPPED | OUTPATIENT
Start: 2025-07-01 | End: 2025-07-03 | Stop reason: DRUGHIGH

## 2025-07-02 NOTE — TELEPHONE ENCOUNTER
Pended new dose. Injection due on Friday. Needs to  Thursday. Rossy Kee RN on 7/2/2025 at 2:15 PM

## 2025-08-25 ENCOUNTER — MYC REFILL (OUTPATIENT)
Dept: FAMILY MEDICINE | Facility: CLINIC | Age: 60
End: 2025-08-25
Payer: COMMERCIAL

## 2025-08-25 ENCOUNTER — MYC MEDICAL ADVICE (OUTPATIENT)
Dept: FAMILY MEDICINE | Facility: CLINIC | Age: 60
End: 2025-08-25
Payer: COMMERCIAL

## 2025-08-25 DIAGNOSIS — E66.01 CLASS 2 SEVERE OBESITY DUE TO EXCESS CALORIES WITH SERIOUS COMORBIDITY AND BODY MASS INDEX (BMI) OF 35.0 TO 35.9 IN ADULT (H): ICD-10-CM

## 2025-08-25 DIAGNOSIS — E78.5 HYPERLIPIDEMIA LDL GOAL <100: ICD-10-CM

## 2025-08-25 DIAGNOSIS — E66.812 CLASS 2 SEVERE OBESITY DUE TO EXCESS CALORIES WITH SERIOUS COMORBIDITY AND BODY MASS INDEX (BMI) OF 35.0 TO 35.9 IN ADULT (H): ICD-10-CM

## 2025-08-25 DIAGNOSIS — I10 BENIGN ESSENTIAL HYPERTENSION: ICD-10-CM
